# Patient Record
Sex: MALE | Race: WHITE | NOT HISPANIC OR LATINO
[De-identification: names, ages, dates, MRNs, and addresses within clinical notes are randomized per-mention and may not be internally consistent; named-entity substitution may affect disease eponyms.]

---

## 2017-10-02 ENCOUNTER — RX RENEWAL (OUTPATIENT)
Age: 72
End: 2017-10-02

## 2017-10-07 ENCOUNTER — TRANSCRIPTION ENCOUNTER (OUTPATIENT)
Age: 72
End: 2017-10-07

## 2017-10-25 ENCOUNTER — RX RENEWAL (OUTPATIENT)
Age: 72
End: 2017-10-25

## 2017-10-25 ENCOUNTER — FORM ENCOUNTER (OUTPATIENT)
Age: 72
End: 2017-10-25

## 2017-10-25 ENCOUNTER — APPOINTMENT (OUTPATIENT)
Dept: FAMILY MEDICINE | Facility: CLINIC | Age: 72
End: 2017-10-25
Payer: MEDICARE

## 2017-10-25 VITALS — RESPIRATION RATE: 16 BRPM | HEART RATE: 72 BPM | SYSTOLIC BLOOD PRESSURE: 130 MMHG | DIASTOLIC BLOOD PRESSURE: 80 MMHG

## 2017-10-25 VITALS
DIASTOLIC BLOOD PRESSURE: 82 MMHG | BODY MASS INDEX: 29.53 KG/M2 | HEIGHT: 72 IN | OXYGEN SATURATION: 95 % | WEIGHT: 218 LBS | SYSTOLIC BLOOD PRESSURE: 130 MMHG | HEART RATE: 66 BPM

## 2017-10-25 DIAGNOSIS — R10.824 LEFT LOWER QUADRANT REBOUND ABDOMINAL TENDERNESS: ICD-10-CM

## 2017-10-25 DIAGNOSIS — N40.0 BENIGN PROSTATIC HYPERPLASIA WITHOUT LOWER URINARY TRACT SYMPMS: ICD-10-CM

## 2017-10-25 PROCEDURE — G0008: CPT

## 2017-10-25 PROCEDURE — 90662 IIV NO PRSV INCREASED AG IM: CPT

## 2017-10-25 PROCEDURE — G0439: CPT

## 2017-10-26 ENCOUNTER — MED ADMIN CHARGE (OUTPATIENT)
Age: 72
End: 2017-10-26

## 2017-10-26 ENCOUNTER — OUTPATIENT (OUTPATIENT)
Dept: OUTPATIENT SERVICES | Facility: HOSPITAL | Age: 72
LOS: 1 days | End: 2017-10-26
Payer: MEDICARE

## 2017-10-26 ENCOUNTER — APPOINTMENT (OUTPATIENT)
Dept: CT IMAGING | Facility: CLINIC | Age: 72
End: 2017-10-26

## 2017-10-26 DIAGNOSIS — Z00.8 ENCOUNTER FOR OTHER GENERAL EXAMINATION: ICD-10-CM

## 2017-10-26 LAB
ALBUMIN SERPL ELPH-MCNC: 4 G/DL
ALP BLD-CCNC: 56 U/L
ALT SERPL-CCNC: 15 U/L
ANION GAP SERPL CALC-SCNC: 13 MMOL/L
APPEARANCE: CLEAR
AST SERPL-CCNC: 14 U/L
BACTERIA: ABNORMAL
BASOPHILS # BLD AUTO: 0.01 K/UL
BASOPHILS NFR BLD AUTO: 0.1 %
BILIRUB SERPL-MCNC: 0.6 MG/DL
BILIRUBIN URINE: NEGATIVE
BLOOD URINE: ABNORMAL
BUN SERPL-MCNC: 15 MG/DL
CALCIUM OXALATE CRYSTALS: ABNORMAL
CALCIUM SERPL-MCNC: 9.8 MG/DL
CHLORIDE SERPL-SCNC: 102 MMOL/L
CHOLEST SERPL-MCNC: 154 MG/DL
CHOLEST/HDLC SERPL: 4.2 RATIO
CO2 SERPL-SCNC: 29 MMOL/L
COLOR: YELLOW
CREAT SERPL-MCNC: 1.08 MG/DL
CREAT SPEC-SCNC: 187 MG/DL
EOSINOPHIL # BLD AUTO: 0.13 K/UL
EOSINOPHIL NFR BLD AUTO: 1.7 %
GLUCOSE QUALITATIVE U: NEGATIVE MG/DL
GLUCOSE SERPL-MCNC: 97 MG/DL
HBA1C MFR BLD HPLC: 6.3 %
HCT VFR BLD CALC: 42 %
HDLC SERPL-MCNC: 37 MG/DL
HGB BLD-MCNC: 13.4 G/DL
HYALINE CASTS: 0 /LPF
IMM GRANULOCYTES NFR BLD AUTO: 0.3 %
KETONES URINE: NEGATIVE
LDLC SERPL CALC-MCNC: 77 MG/DL
LEUKOCYTE ESTERASE URINE: ABNORMAL
LYMPHOCYTES # BLD AUTO: 1.74 K/UL
LYMPHOCYTES NFR BLD AUTO: 23.3 %
MAN DIFF?: NORMAL
MCHC RBC-ENTMCNC: 30.6 PG
MCHC RBC-ENTMCNC: 31.9 GM/DL
MCV RBC AUTO: 95.9 FL
MICROALBUMIN 24H UR DL<=1MG/L-MCNC: 1.2 MG/DL
MICROALBUMIN/CREAT 24H UR-RTO: 6 MG/G
MICROSCOPIC-UA: NORMAL
MONOCYTES # BLD AUTO: 0.51 K/UL
MONOCYTES NFR BLD AUTO: 6.8 %
NEUTROPHILS # BLD AUTO: 5.07 K/UL
NEUTROPHILS NFR BLD AUTO: 67.8 %
NITRITE URINE: POSITIVE
PH URINE: 5.5
PLATELET # BLD AUTO: 236 K/UL
POTASSIUM SERPL-SCNC: 4.7 MMOL/L
PROT SERPL-MCNC: 6.9 G/DL
PROTEIN URINE: NEGATIVE MG/DL
RBC # BLD: 4.38 M/UL
RBC # FLD: 14.2 %
RED BLOOD CELLS URINE: 2 /HPF
SODIUM SERPL-SCNC: 144 MMOL/L
SPECIFIC GRAVITY URINE: 1.03
SQUAMOUS EPITHELIAL CELLS: 0 /HPF
T4 SERPL-MCNC: 5.2 UG/DL
TRIGL SERPL-MCNC: 202 MG/DL
TSH SERPL-ACNC: 1.24 UIU/ML
URATE SERPL-MCNC: 5.7 MG/DL
UROBILINOGEN URINE: NEGATIVE MG/DL
WBC # FLD AUTO: 7.48 K/UL
WHITE BLOOD CELLS URINE: 34 /HPF

## 2017-10-26 PROCEDURE — 74177 CT ABD & PELVIS W/CONTRAST: CPT

## 2017-10-26 PROCEDURE — 74177 CT ABD & PELVIS W/CONTRAST: CPT | Mod: 26

## 2017-10-26 PROCEDURE — 82565 ASSAY OF CREATININE: CPT

## 2017-10-28 LAB — LAMOTRIGINE SERPL-MCNC: 4.7 MCG/ML

## 2017-10-31 ENCOUNTER — RX RENEWAL (OUTPATIENT)
Age: 72
End: 2017-10-31

## 2017-10-31 DIAGNOSIS — G47.00 INSOMNIA, UNSPECIFIED: ICD-10-CM

## 2017-11-04 LAB — HEMOCCULT STL QL IA: NEGATIVE

## 2017-11-06 ENCOUNTER — APPOINTMENT (OUTPATIENT)
Dept: FAMILY MEDICINE | Facility: CLINIC | Age: 72
End: 2017-11-06
Payer: MEDICARE

## 2017-11-06 ENCOUNTER — RX RENEWAL (OUTPATIENT)
Age: 72
End: 2017-11-06

## 2017-11-06 VITALS
BODY MASS INDEX: 29.8 KG/M2 | DIASTOLIC BLOOD PRESSURE: 80 MMHG | SYSTOLIC BLOOD PRESSURE: 140 MMHG | WEIGHT: 220 LBS | HEIGHT: 72 IN

## 2017-11-06 VITALS — DIASTOLIC BLOOD PRESSURE: 80 MMHG | SYSTOLIC BLOOD PRESSURE: 140 MMHG

## 2017-11-06 PROCEDURE — 99213 OFFICE O/P EST LOW 20 MIN: CPT

## 2017-11-14 ENCOUNTER — RX RENEWAL (OUTPATIENT)
Age: 72
End: 2017-11-14

## 2018-06-12 ENCOUNTER — APPOINTMENT (OUTPATIENT)
Dept: FAMILY MEDICINE | Facility: CLINIC | Age: 73
End: 2018-06-12
Payer: MEDICARE

## 2018-06-12 ENCOUNTER — TRANSCRIPTION ENCOUNTER (OUTPATIENT)
Age: 73
End: 2018-06-12

## 2018-06-12 VITALS
BODY MASS INDEX: 28.58 KG/M2 | TEMPERATURE: 97.6 F | RESPIRATION RATE: 16 BRPM | OXYGEN SATURATION: 97 % | WEIGHT: 211 LBS | HEIGHT: 72 IN | SYSTOLIC BLOOD PRESSURE: 132 MMHG | DIASTOLIC BLOOD PRESSURE: 76 MMHG | HEART RATE: 77 BPM

## 2018-06-12 VITALS — SYSTOLIC BLOOD PRESSURE: 130 MMHG | HEART RATE: 72 BPM | DIASTOLIC BLOOD PRESSURE: 80 MMHG | RESPIRATION RATE: 16 BRPM

## 2018-06-12 DIAGNOSIS — K57.92 DIVERTICULITIS OF INTESTINE, PART UNSPECIFIED, W/OUT PERFORATION OR ABSCESS W/OUT BLEEDING: ICD-10-CM

## 2018-06-12 PROCEDURE — 36415 COLL VENOUS BLD VENIPUNCTURE: CPT

## 2018-06-12 PROCEDURE — 99214 OFFICE O/P EST MOD 30 MIN: CPT | Mod: 25

## 2018-06-12 RX ORDER — RAMELTEON 8 MG/1
8 TABLET, FILM COATED ORAL
Qty: 90 | Refills: 0 | Status: COMPLETED | COMMUNITY
Start: 2017-11-06 | End: 2018-06-12

## 2018-06-12 RX ORDER — CIPROFLOXACIN HYDROCHLORIDE 500 MG/1
500 TABLET, FILM COATED ORAL TWICE DAILY
Qty: 20 | Refills: 0 | Status: COMPLETED | COMMUNITY
Start: 2017-10-26 | End: 2018-06-12

## 2018-06-12 NOTE — HISTORY OF PRESENT ILLNESS
[Abdominal Pain] : abdominal pain [___ Weeks ago] : [unfilled] weeks ago [In Morning] : in the morning [Stable] : stable [FreeTextEntry8] : no   seizures   glucose  has  been good  checks occ  never more than 160

## 2018-06-12 NOTE — ASSESSMENT
[FreeTextEntry1] : DIVERTICULITIS START CIPRO AND FLAGY CT ABD AND PELVIS DM HLD  CHECK LABS  USING  C-PAP

## 2018-06-12 NOTE — PHYSICAL EXAM
[No Acute Distress] : no acute distress [Normal Voice/Communication] : normal voice/communication [PERRL] : pupils equal round and reactive to light [Normal Oropharynx] : the oropharynx was normal [Supple] : supple [Clear to Auscultation] : lungs were clear to auscultation bilaterally [No Edema] : there was no peripheral edema [Soft] : abdomen soft [No HSM] : no HSM [Normal Bowel Sounds] : normal bowel sounds [Normal Supraclavicular Nodes] : no supraclavicular lymphadenopathy [Normal Anterior Cervical Nodes] : no anterior cervical lymphadenopathy [No Rash] : no rash [Normal Gait] : normal gait [Speech Grossly Normal] : speech grossly normal [de-identified] : jose rafael ORTIZ

## 2018-06-13 LAB
ALBUMIN SERPL ELPH-MCNC: 4.1 G/DL
ALP BLD-CCNC: 63 U/L
ALT SERPL-CCNC: 14 U/L
ANION GAP SERPL CALC-SCNC: 14 MMOL/L
APPEARANCE: CLEAR
AST SERPL-CCNC: 20 U/L
BACTERIA: ABNORMAL
BASOPHILS # BLD AUTO: 0.01 K/UL
BASOPHILS NFR BLD AUTO: 0.2 %
BILIRUB SERPL-MCNC: 0.6 MG/DL
BILIRUBIN URINE: NEGATIVE
BLOOD URINE: ABNORMAL
BUN SERPL-MCNC: 21 MG/DL
CALCIUM SERPL-MCNC: 9.5 MG/DL
CHLORIDE SERPL-SCNC: 103 MMOL/L
CHOLEST SERPL-MCNC: 154 MG/DL
CHOLEST/HDLC SERPL: 4.4 RATIO
CO2 SERPL-SCNC: 28 MMOL/L
COLOR: YELLOW
CREAT SERPL-MCNC: 1.25 MG/DL
CREAT SPEC-SCNC: 170 MG/DL
EOSINOPHIL # BLD AUTO: 0.17 K/UL
EOSINOPHIL NFR BLD AUTO: 2.6 %
GLUCOSE QUALITATIVE U: NEGATIVE MG/DL
GLUCOSE SERPL-MCNC: 89 MG/DL
HBA1C MFR BLD HPLC: 6.6 %
HCT VFR BLD CALC: 43.9 %
HDLC SERPL-MCNC: 35 MG/DL
HGB BLD-MCNC: 13.7 G/DL
HYALINE CASTS: 0 /LPF
IMM GRANULOCYTES NFR BLD AUTO: 0.3 %
KETONES URINE: NEGATIVE
LDLC SERPL CALC-MCNC: 74 MG/DL
LEUKOCYTE ESTERASE URINE: NEGATIVE
LYMPHOCYTES # BLD AUTO: 1.89 K/UL
LYMPHOCYTES NFR BLD AUTO: 28.6 %
MAN DIFF?: NORMAL
MCHC RBC-ENTMCNC: 29.9 PG
MCHC RBC-ENTMCNC: 31.2 GM/DL
MCV RBC AUTO: 95.9 FL
MICROALBUMIN 24H UR DL<=1MG/L-MCNC: 0.5 MG/DL
MICROALBUMIN/CREAT 24H UR-RTO: 3 MG/G
MICROSCOPIC-UA: NORMAL
MONOCYTES # BLD AUTO: 0.43 K/UL
MONOCYTES NFR BLD AUTO: 6.5 %
NEUTROPHILS # BLD AUTO: 4.08 K/UL
NEUTROPHILS NFR BLD AUTO: 61.8 %
NITRITE URINE: POSITIVE
PH URINE: 5.5
PLATELET # BLD AUTO: 264 K/UL
POTASSIUM SERPL-SCNC: 4.6 MMOL/L
PROT SERPL-MCNC: 7.5 G/DL
PROTEIN URINE: NEGATIVE MG/DL
RBC # BLD: 4.58 M/UL
RBC # FLD: 14.1 %
RED BLOOD CELLS URINE: 0 /HPF
SODIUM SERPL-SCNC: 145 MMOL/L
SPECIFIC GRAVITY URINE: 1.03
SQUAMOUS EPITHELIAL CELLS: 1 /HPF
T4 SERPL-MCNC: 6.6 UG/DL
TRIGL SERPL-MCNC: 226 MG/DL
TSH SERPL-ACNC: 0.93 UIU/ML
URATE SERPL-MCNC: 6.1 MG/DL
UROBILINOGEN URINE: NEGATIVE MG/DL
WBC # FLD AUTO: 6.6 K/UL
WHITE BLOOD CELLS URINE: 4 /HPF

## 2018-06-17 ENCOUNTER — FORM ENCOUNTER (OUTPATIENT)
Age: 73
End: 2018-06-17

## 2018-06-18 ENCOUNTER — OUTPATIENT (OUTPATIENT)
Dept: OUTPATIENT SERVICES | Facility: HOSPITAL | Age: 73
LOS: 1 days | End: 2018-06-18
Payer: MEDICARE

## 2018-06-18 ENCOUNTER — APPOINTMENT (OUTPATIENT)
Dept: CT IMAGING | Facility: CLINIC | Age: 73
End: 2018-06-18
Payer: MEDICARE

## 2018-06-18 DIAGNOSIS — Z00.8 ENCOUNTER FOR OTHER GENERAL EXAMINATION: ICD-10-CM

## 2018-06-18 PROCEDURE — 74177 CT ABD & PELVIS W/CONTRAST: CPT | Mod: 26

## 2018-06-18 PROCEDURE — 74177 CT ABD & PELVIS W/CONTRAST: CPT

## 2018-06-19 ENCOUNTER — TRANSCRIPTION ENCOUNTER (OUTPATIENT)
Age: 73
End: 2018-06-19

## 2018-06-20 ENCOUNTER — TRANSCRIPTION ENCOUNTER (OUTPATIENT)
Age: 73
End: 2018-06-20

## 2018-09-25 ENCOUNTER — RX RENEWAL (OUTPATIENT)
Age: 73
End: 2018-09-25

## 2018-10-24 ENCOUNTER — RX RENEWAL (OUTPATIENT)
Age: 73
End: 2018-10-24

## 2018-10-28 ENCOUNTER — RX RENEWAL (OUTPATIENT)
Age: 73
End: 2018-10-28

## 2018-10-29 ENCOUNTER — TRANSCRIPTION ENCOUNTER (OUTPATIENT)
Age: 73
End: 2018-10-29

## 2018-11-05 ENCOUNTER — TRANSCRIPTION ENCOUNTER (OUTPATIENT)
Age: 73
End: 2018-11-05

## 2018-12-13 ENCOUNTER — APPOINTMENT (OUTPATIENT)
Dept: FAMILY MEDICINE | Facility: CLINIC | Age: 73
End: 2018-12-13
Payer: MEDICARE

## 2018-12-13 VITALS — SYSTOLIC BLOOD PRESSURE: 130 MMHG | RESPIRATION RATE: 16 BRPM | HEART RATE: 72 BPM | DIASTOLIC BLOOD PRESSURE: 80 MMHG

## 2018-12-13 VITALS
SYSTOLIC BLOOD PRESSURE: 140 MMHG | HEART RATE: 73 BPM | OXYGEN SATURATION: 97 % | DIASTOLIC BLOOD PRESSURE: 80 MMHG | BODY MASS INDEX: 30.09 KG/M2 | HEIGHT: 72 IN | WEIGHT: 222.13 LBS

## 2018-12-13 PROCEDURE — G0439: CPT

## 2018-12-13 PROCEDURE — 99213 OFFICE O/P EST LOW 20 MIN: CPT | Mod: 25

## 2018-12-13 RX ORDER — METRONIDAZOLE 250 MG/1
250 TABLET ORAL
Qty: 30 | Refills: 0 | Status: COMPLETED | COMMUNITY
Start: 2018-06-12 | End: 2018-12-13

## 2018-12-13 RX ORDER — CIPROFLOXACIN HYDROCHLORIDE 500 MG/1
500 TABLET, FILM COATED ORAL TWICE DAILY
Qty: 20 | Refills: 0 | Status: COMPLETED | COMMUNITY
Start: 2018-06-12 | End: 2018-12-13

## 2018-12-13 NOTE — REVIEW OF SYSTEMS
[Itching] : itching [Hearing Loss] : hearing loss [Shortness Of Breath] : shortness of breath [Nocturia] : nocturia [Negative] : Heme/Lymph [Fatigue] : no fatigue

## 2018-12-13 NOTE — PHYSICAL EXAM
[No Acute Distress] : no acute distress [Normal Voice/Communication] : normal voice/communication [PERRL] : pupils equal round and reactive to light [Normal Oropharynx] : the oropharynx was normal [Supple] : supple [Clear to Auscultation] : lungs were clear to auscultation bilaterally [No Edema] : there was no peripheral edema [Soft] : abdomen soft [No HSM] : no HSM [Normal Bowel Sounds] : normal bowel sounds [Normal Supraclavicular Nodes] : no supraclavicular lymphadenopathy [Normal Anterior Cervical Nodes] : no anterior cervical lymphadenopathy [No Rash] : no rash [Normal Gait] : normal gait [Speech Grossly Normal] : speech grossly normal [de-identified] : VENTRAL HERNIA

## 2018-12-13 NOTE — HEALTH RISK ASSESSMENT
[Very Good] : ~his/her~  mood as very good [No falls in past year] : Patient reported no falls in the past year [0] : 2) Feeling down, depressed, or hopeless: Not at all (0) [Patient reported colonoscopy was normal] : Patient reported colonoscopy was normal [None] : None [With Family] : lives with family [] :  [Fully functional (bathing, dressing, toileting, transferring, walking, feeding)] : Fully functional (bathing, dressing, toileting, transferring, walking, feeding) [Fully functional (using the telephone, shopping, preparing meals, housekeeping, doing laundry, using] : Fully functional and needs no help or supervision to perform IADLs (using the telephone, shopping, preparing meals, housekeeping, doing laundry, using transportation, managing medications and managing finances) [Reports changes in hearing] : Reports changes in hearing [] : No [de-identified] : OCC  [Change in mental status noted] : No change in mental status noted [Reports changes in vision] : Reports no changes in vision [Reports changes in dental health] : Reports no changes in dental health

## 2018-12-13 NOTE — HISTORY OF PRESENT ILLNESS
[FreeTextEntry1] : pt here  for cpe and management of HTN DM HLD  DEPRESSION  [de-identified] : MOOD  GOOD DOES NOT CHECK GLUCOSE OFTEN TAKING ALL MEDS NO SEIZURES

## 2018-12-15 LAB
ALBUMIN SERPL ELPH-MCNC: 4.4 G/DL
ALP BLD-CCNC: 72 U/L
ALT SERPL-CCNC: 17 U/L
ANION GAP SERPL CALC-SCNC: 11 MMOL/L
APPEARANCE: CLEAR
AST SERPL-CCNC: 16 U/L
BACTERIA: ABNORMAL
BASOPHILS # BLD AUTO: 0.01 K/UL
BASOPHILS NFR BLD AUTO: 0.1 %
BILIRUB SERPL-MCNC: 0.6 MG/DL
BILIRUBIN URINE: NEGATIVE
BLOOD URINE: ABNORMAL
BUN SERPL-MCNC: 16 MG/DL
CALCIUM SERPL-MCNC: 9.6 MG/DL
CHLORIDE SERPL-SCNC: 105 MMOL/L
CHOLEST SERPL-MCNC: 147 MG/DL
CHOLEST/HDLC SERPL: 4.1 RATIO
CO2 SERPL-SCNC: 29 MMOL/L
COLOR: YELLOW
CREAT SERPL-MCNC: 1.07 MG/DL
CREAT SPEC-SCNC: 132 MG/DL
EOSINOPHIL # BLD AUTO: 0.12 K/UL
EOSINOPHIL NFR BLD AUTO: 1.7 %
GLUCOSE QUALITATIVE U: NEGATIVE MG/DL
GLUCOSE SERPL-MCNC: 121 MG/DL
HBA1C MFR BLD HPLC: 6.4 %
HCT VFR BLD CALC: 44.9 %
HDLC SERPL-MCNC: 36 MG/DL
HGB BLD-MCNC: 14.2 G/DL
IMM GRANULOCYTES NFR BLD AUTO: 0.4 %
KETONES URINE: NEGATIVE
LDLC SERPL CALC-MCNC: 51 MG/DL
LEUKOCYTE ESTERASE URINE: ABNORMAL
LYMPHOCYTES # BLD AUTO: 1.39 K/UL
LYMPHOCYTES NFR BLD AUTO: 19.1 %
MAN DIFF?: NORMAL
MCHC RBC-ENTMCNC: 29.3 PG
MCHC RBC-ENTMCNC: 31.6 GM/DL
MCV RBC AUTO: 92.8 FL
MICROALBUMIN 24H UR DL<=1MG/L-MCNC: <1.2 MG/DL
MICROALBUMIN/CREAT 24H UR-RTO: NORMAL
MICROSCOPIC-UA: NORMAL
MONOCYTES # BLD AUTO: 0.5 K/UL
MONOCYTES NFR BLD AUTO: 6.9 %
NEUTROPHILS # BLD AUTO: 5.22 K/UL
NEUTROPHILS NFR BLD AUTO: 71.8 %
NITRITE URINE: POSITIVE
PH URINE: 5
PLATELET # BLD AUTO: 252 K/UL
POTASSIUM SERPL-SCNC: 4.7 MMOL/L
PROT SERPL-MCNC: 7 G/DL
PROTEIN URINE: NEGATIVE MG/DL
RBC # BLD: 4.84 M/UL
RBC # FLD: 15.2 %
RED BLOOD CELLS URINE: 3 /HPF
SODIUM SERPL-SCNC: 145 MMOL/L
SPECIFIC GRAVITY URINE: 1.02
SQUAMOUS EPITHELIAL CELLS: 1 /HPF
T4 SERPL-MCNC: 6.6 UG/DL
TRIGL SERPL-MCNC: 302 MG/DL
TSH SERPL-ACNC: 1.14 UIU/ML
URATE SERPL-MCNC: 6 MG/DL
UROBILINOGEN URINE: NEGATIVE MG/DL
WBC # FLD AUTO: 7.27 K/UL
WHITE BLOOD CELLS URINE: 13 /HPF

## 2018-12-17 ENCOUNTER — TRANSCRIPTION ENCOUNTER (OUTPATIENT)
Age: 73
End: 2018-12-17

## 2019-04-03 ENCOUNTER — TRANSCRIPTION ENCOUNTER (OUTPATIENT)
Age: 74
End: 2019-04-03

## 2019-04-05 ENCOUNTER — TRANSCRIPTION ENCOUNTER (OUTPATIENT)
Age: 74
End: 2019-04-05

## 2019-04-09 ENCOUNTER — APPOINTMENT (OUTPATIENT)
Dept: FAMILY MEDICINE | Facility: CLINIC | Age: 74
End: 2019-04-09
Payer: MEDICARE

## 2019-04-09 VITALS — HEART RATE: 72 BPM | SYSTOLIC BLOOD PRESSURE: 140 MMHG | RESPIRATION RATE: 16 BRPM | DIASTOLIC BLOOD PRESSURE: 80 MMHG

## 2019-04-09 VITALS
HEART RATE: 68 BPM | SYSTOLIC BLOOD PRESSURE: 126 MMHG | OXYGEN SATURATION: 96 % | BODY MASS INDEX: 29.12 KG/M2 | DIASTOLIC BLOOD PRESSURE: 80 MMHG | RESPIRATION RATE: 16 BRPM | WEIGHT: 215 LBS | HEIGHT: 72 IN

## 2019-04-09 PROCEDURE — 99214 OFFICE O/P EST MOD 30 MIN: CPT

## 2019-04-09 RX ORDER — ZOLPIDEM TARTRATE 10 MG/1
10 TABLET ORAL
Qty: 90 | Refills: 0 | Status: COMPLETED | COMMUNITY
Start: 2017-10-31 | End: 2019-04-09

## 2019-04-09 NOTE — PHYSICAL EXAM
[No Acute Distress] : no acute distress [PERRL] : pupils equal round and reactive to light [Normal Oropharynx] : the oropharynx was normal [Normal Voice/Communication] : normal voice/communication [Supple] : supple [Clear to Auscultation] : lungs were clear to auscultation bilaterally [No Edema] : there was no peripheral edema [Soft] : abdomen soft [Normal Supraclavicular Nodes] : no supraclavicular lymphadenopathy [Normal Bowel Sounds] : normal bowel sounds [No HSM] : no HSM [Normal Gait] : normal gait [No Rash] : no rash [Normal Anterior Cervical Nodes] : no anterior cervical lymphadenopathy [Speech Grossly Normal] : speech grossly normal [de-identified] : VENTRAL HERNIA

## 2019-04-09 NOTE — HISTORY OF PRESENT ILLNESS
[de-identified] : has  been  using  c-pap  for  over  30 yrs  last machine  was  5 yrs  ago needs  new  machine no  recent  seizures checks  glucose  tid  ave 120s last  a1c  6.4 taking sertraline and doing  well mood  good has  not gone  for mri of  Pancrease for  cyst  [FreeTextEntry1] : [facility]  management

## 2019-04-09 NOTE — REVIEW OF SYSTEMS
[Itching] : itching [Hearing Loss] : hearing loss [Dyspnea on Exertion] : dyspnea on exertion [Shortness Of Breath] : shortness of breath [Nocturia] : nocturia [Insomnia] : insomnia [Negative] : Psychiatric [Palpitations] : no palpitations [Chest Pain] : no chest pain [Fatigue] : no fatigue [Constipation] : no constipation [Diarrhea] : no diarrhea [Dysuria] : no dysuria [Swollen Glands] : no swollen glands

## 2019-04-10 ENCOUNTER — TRANSCRIPTION ENCOUNTER (OUTPATIENT)
Age: 74
End: 2019-04-10

## 2019-04-10 LAB
ALBUMIN SERPL ELPH-MCNC: 4.4 G/DL
ALP BLD-CCNC: 70 U/L
ALT SERPL-CCNC: 17 U/L
ANION GAP SERPL CALC-SCNC: 10 MMOL/L
APPEARANCE: ABNORMAL
AST SERPL-CCNC: 16 U/L
BACTERIA: ABNORMAL
BASOPHILS # BLD AUTO: 0.03 K/UL
BASOPHILS NFR BLD AUTO: 0.3 %
BILIRUB SERPL-MCNC: 0.6 MG/DL
BILIRUBIN URINE: NEGATIVE
BLOOD URINE: NORMAL
BUN SERPL-MCNC: 15 MG/DL
CALCIUM OXALATE CRYSTALS: ABNORMAL
CALCIUM SERPL-MCNC: 9.6 MG/DL
CHLORIDE SERPL-SCNC: 105 MMOL/L
CHOLEST SERPL-MCNC: 143 MG/DL
CHOLEST/HDLC SERPL: 4.1 RATIO
CO2 SERPL-SCNC: 30 MMOL/L
COLOR: YELLOW
CREAT SERPL-MCNC: 1.15 MG/DL
CREAT SPEC-SCNC: 152 MG/DL
EOSINOPHIL # BLD AUTO: 0.19 K/UL
EOSINOPHIL NFR BLD AUTO: 2.2 %
ESTIMATED AVERAGE GLUCOSE: 146 MG/DL
GLUCOSE QUALITATIVE U: NEGATIVE
GLUCOSE SERPL-MCNC: 126 MG/DL
HBA1C MFR BLD HPLC: 6.7 %
HCT VFR BLD CALC: 43.7 %
HDLC SERPL-MCNC: 35 MG/DL
HGB BLD-MCNC: 13.9 G/DL
HYALINE CASTS: 2 /LPF
IMM GRANULOCYTES NFR BLD AUTO: 0.7 %
KETONES URINE: NEGATIVE
LDLC SERPL CALC-MCNC: 58 MG/DL
LEUKOCYTE ESTERASE URINE: ABNORMAL
LYMPHOCYTES # BLD AUTO: 1.9 K/UL
LYMPHOCYTES NFR BLD AUTO: 21.6 %
MAN DIFF?: NORMAL
MCHC RBC-ENTMCNC: 29.4 PG
MCHC RBC-ENTMCNC: 31.8 GM/DL
MCV RBC AUTO: 92.6 FL
MICROALBUMIN 24H UR DL<=1MG/L-MCNC: <1.2 MG/DL
MICROALBUMIN/CREAT 24H UR-RTO: NORMAL MG/G
MICROSCOPIC-UA: NORMAL
MONOCYTES # BLD AUTO: 0.5 K/UL
MONOCYTES NFR BLD AUTO: 5.7 %
NEUTROPHILS # BLD AUTO: 6.13 K/UL
NEUTROPHILS NFR BLD AUTO: 69.5 %
NITRITE URINE: POSITIVE
PH URINE: 6
PLATELET # BLD AUTO: 254 K/UL
POTASSIUM SERPL-SCNC: 4.5 MMOL/L
PROT SERPL-MCNC: 6.9 G/DL
PROTEIN URINE: ABNORMAL
RBC # BLD: 4.72 M/UL
RBC # FLD: 14.9 %
RED BLOOD CELLS URINE: 2 /HPF
SODIUM SERPL-SCNC: 144 MMOL/L
SPECIFIC GRAVITY URINE: 1.02
SQUAMOUS EPITHELIAL CELLS: 3 /HPF
T4 SERPL-MCNC: 5.7 UG/DL
TRIGL SERPL-MCNC: 250 MG/DL
TSH SERPL-ACNC: 1.4 UIU/ML
URATE SERPL-MCNC: 5.8 MG/DL
URINE COMMENTS: NORMAL
UROBILINOGEN URINE: NORMAL
WBC # FLD AUTO: 8.81 K/UL
WHITE BLOOD CELLS URINE: 50 /HPF

## 2019-04-12 ENCOUNTER — RX RENEWAL (OUTPATIENT)
Age: 74
End: 2019-04-12

## 2019-04-12 ENCOUNTER — TRANSCRIPTION ENCOUNTER (OUTPATIENT)
Age: 74
End: 2019-04-12

## 2019-04-14 ENCOUNTER — FORM ENCOUNTER (OUTPATIENT)
Age: 74
End: 2019-04-14

## 2019-04-15 ENCOUNTER — OUTPATIENT (OUTPATIENT)
Dept: OUTPATIENT SERVICES | Facility: HOSPITAL | Age: 74
LOS: 1 days | End: 2019-04-15
Payer: MEDICARE

## 2019-04-15 ENCOUNTER — APPOINTMENT (OUTPATIENT)
Dept: MRI IMAGING | Facility: CLINIC | Age: 74
End: 2019-04-15
Payer: MEDICARE

## 2019-04-15 DIAGNOSIS — Z00.8 ENCOUNTER FOR OTHER GENERAL EXAMINATION: ICD-10-CM

## 2019-04-15 PROCEDURE — 72197 MRI PELVIS W/O & W/DYE: CPT

## 2019-04-15 PROCEDURE — 74183 MRI ABD W/O CNTR FLWD CNTR: CPT | Mod: 26

## 2019-04-15 PROCEDURE — 72197 MRI PELVIS W/O & W/DYE: CPT | Mod: 26

## 2019-04-15 PROCEDURE — 74183 MRI ABD W/O CNTR FLWD CNTR: CPT

## 2019-04-15 PROCEDURE — A9585: CPT

## 2019-04-16 ENCOUNTER — TRANSCRIPTION ENCOUNTER (OUTPATIENT)
Age: 74
End: 2019-04-16

## 2019-04-18 ENCOUNTER — TRANSCRIPTION ENCOUNTER (OUTPATIENT)
Age: 74
End: 2019-04-18

## 2019-04-20 ENCOUNTER — TRANSCRIPTION ENCOUNTER (OUTPATIENT)
Age: 74
End: 2019-04-20

## 2019-06-12 ENCOUNTER — RX RENEWAL (OUTPATIENT)
Age: 74
End: 2019-06-12

## 2019-10-28 ENCOUNTER — RX RENEWAL (OUTPATIENT)
Age: 74
End: 2019-10-28

## 2019-12-29 ENCOUNTER — RX RENEWAL (OUTPATIENT)
Age: 74
End: 2019-12-29

## 2019-12-31 ENCOUNTER — APPOINTMENT (OUTPATIENT)
Dept: FAMILY MEDICINE | Facility: CLINIC | Age: 74
End: 2019-12-31
Payer: MEDICARE

## 2019-12-31 ENCOUNTER — RX RENEWAL (OUTPATIENT)
Age: 74
End: 2019-12-31

## 2019-12-31 VITALS — DIASTOLIC BLOOD PRESSURE: 90 MMHG | RESPIRATION RATE: 16 BRPM | HEART RATE: 72 BPM | SYSTOLIC BLOOD PRESSURE: 130 MMHG

## 2019-12-31 VITALS
HEART RATE: 70 BPM | HEIGHT: 72 IN | WEIGHT: 219 LBS | DIASTOLIC BLOOD PRESSURE: 84 MMHG | BODY MASS INDEX: 29.66 KG/M2 | SYSTOLIC BLOOD PRESSURE: 136 MMHG | OXYGEN SATURATION: 96 %

## 2019-12-31 PROCEDURE — 99213 OFFICE O/P EST LOW 20 MIN: CPT | Mod: 25

## 2019-12-31 PROCEDURE — G0439: CPT

## 2019-12-31 RX ORDER — KETOTIFEN FUMARATE 0.25 MG/ML
0.03 SOLUTION OPHTHALMIC
Qty: 1 | Refills: 2 | Status: COMPLETED | COMMUNITY
Start: 2018-12-17 | End: 2019-12-31

## 2019-12-31 NOTE — PHYSICAL EXAM
[No Acute Distress] : no acute distress [PERRL] : pupils equal round and reactive to light [Normal Oropharynx] : the oropharynx was normal [Supple] : supple [Normal Rate] : normal rate  [No Murmur] : no murmur heard [No Edema] : there was no peripheral edema [Normal] : no posterior cervical lymphadenopathy and no anterior cervical lymphadenopathy [No Joint Swelling] : no joint swelling [No Rash] : no rash [Normal Gait] : normal gait [FreeTextEntry1] : VALERIA  [de-identified] : VALERIA

## 2019-12-31 NOTE — REVIEW OF SYSTEMS
[Dyspnea on Exertion] : dyspnea on exertion [Joint Swelling] : joint swelling [Fever] : no fever [Chest Pain] : no chest pain [Abdominal Pain] : no abdominal pain [Diarrhea] : no diarrhea [Constipation] : no constipation [Joint Pain] : no joint pain [Skin Rash] : no skin rash [Dizziness] : no dizziness [Insomnia] : no insomnia [Swollen Glands] : no swollen glands

## 2019-12-31 NOTE — HEALTH RISK ASSESSMENT
[Very Good] : ~his/her~  mood as very good [Yes] : Yes [3 or 4 (1 pt)] : 3 or 4  (1 point) [No falls in past year] : Patient reported no falls in the past year [0] : 2) Feeling down, depressed, or hopeless: Not at all (0) [Patient reported colonoscopy was normal] : Patient reported colonoscopy was normal [None] : None [With Family] : lives with family [Employed] : employed [Retired] : retired [College] : College [] :  [# Of Children ___] : has [unfilled] children [Fully functional (bathing, dressing, toileting, transferring, walking, feeding)] : Fully functional (bathing, dressing, toileting, transferring, walking, feeding) [Fully functional (using the telephone, shopping, preparing meals, housekeeping, doing laundry, using] : Fully functional and needs no help or supervision to perform IADLs (using the telephone, shopping, preparing meals, housekeeping, doing laundry, using transportation, managing medications and managing finances) [Reports changes in hearing] : Reports changes in hearing [Reports changes in vision] : Reports changes in vision [Smoke Detector] : smoke detector [Carbon Monoxide Detector] : carbon monoxide detector [Seat Belt] :  uses seat belt [de-identified] : NO  [Change in mental status noted] : No change in mental status noted [Reports changes in dental health] : Reports no changes in dental health [ColonoscopyDate] : 1/18 [de-identified] : HOME  SALES  [de-identified] : CATARACTS  [AdvancecareDate] : 12/19

## 2020-01-01 ENCOUNTER — TRANSCRIPTION ENCOUNTER (OUTPATIENT)
Age: 75
End: 2020-01-01

## 2020-01-01 LAB
ALBUMIN SERPL ELPH-MCNC: 4.3 G/DL
ALP BLD-CCNC: 72 U/L
ALT SERPL-CCNC: 18 U/L
ANION GAP SERPL CALC-SCNC: 12 MMOL/L
APPEARANCE: CLEAR
AST SERPL-CCNC: 16 U/L
BACTERIA: ABNORMAL
BASOPHILS # BLD AUTO: 0.02 K/UL
BASOPHILS NFR BLD AUTO: 0.2 %
BILIRUB SERPL-MCNC: 0.9 MG/DL
BILIRUBIN URINE: NEGATIVE
BLOOD URINE: NEGATIVE
BUN SERPL-MCNC: 18 MG/DL
CALCIUM SERPL-MCNC: 9.5 MG/DL
CHLORIDE SERPL-SCNC: 103 MMOL/L
CHOLEST SERPL-MCNC: 155 MG/DL
CHOLEST/HDLC SERPL: 4.2 RATIO
CO2 SERPL-SCNC: 27 MMOL/L
COLOR: YELLOW
CREAT SERPL-MCNC: 1.2 MG/DL
CREAT SPEC-SCNC: 151 MG/DL
EOSINOPHIL # BLD AUTO: 0.21 K/UL
EOSINOPHIL NFR BLD AUTO: 2.5 %
ESTIMATED AVERAGE GLUCOSE: 143 MG/DL
GLUCOSE QUALITATIVE U: NEGATIVE
GLUCOSE SERPL-MCNC: 129 MG/DL
HBA1C MFR BLD HPLC: 6.6 %
HCT VFR BLD CALC: 44.8 %
HDLC SERPL-MCNC: 37 MG/DL
HGB BLD-MCNC: 14.2 G/DL
HYALINE CASTS: 1 /LPF
IMM GRANULOCYTES NFR BLD AUTO: 0.4 %
KETONES URINE: NEGATIVE
LDLC SERPL CALC-MCNC: 71 MG/DL
LEUKOCYTE ESTERASE URINE: ABNORMAL
LYMPHOCYTES # BLD AUTO: 1.64 K/UL
LYMPHOCYTES NFR BLD AUTO: 19.2 %
MAN DIFF?: NORMAL
MCHC RBC-ENTMCNC: 29.8 PG
MCHC RBC-ENTMCNC: 31.7 GM/DL
MCV RBC AUTO: 94.1 FL
MICROALBUMIN 24H UR DL<=1MG/L-MCNC: 1.3 MG/DL
MICROALBUMIN/CREAT 24H UR-RTO: 9 MG/G
MICROSCOPIC-UA: NORMAL
MONOCYTES # BLD AUTO: 0.53 K/UL
MONOCYTES NFR BLD AUTO: 6.2 %
NEUTROPHILS # BLD AUTO: 6.13 K/UL
NEUTROPHILS NFR BLD AUTO: 71.5 %
NITRITE URINE: POSITIVE
PH URINE: 6
PLATELET # BLD AUTO: 249 K/UL
POTASSIUM SERPL-SCNC: 4.5 MMOL/L
PROT SERPL-MCNC: 6.8 G/DL
PROTEIN URINE: NORMAL
RBC # BLD: 4.76 M/UL
RBC # FLD: 14.2 %
RED BLOOD CELLS URINE: 1 /HPF
SODIUM SERPL-SCNC: 142 MMOL/L
SPECIFIC GRAVITY URINE: 1.03
SQUAMOUS EPITHELIAL CELLS: 2 /HPF
T4 SERPL-MCNC: 6 UG/DL
TRIGL SERPL-MCNC: 235 MG/DL
TSH SERPL-ACNC: 1.68 UIU/ML
URATE SERPL-MCNC: 5.6 MG/DL
UROBILINOGEN URINE: NORMAL
WBC # FLD AUTO: 8.56 K/UL
WHITE BLOOD CELLS URINE: 14 /HPF

## 2020-01-02 LAB — LAMOTRIGINE SERPL-MCNC: 5.1 MCG/ML

## 2020-12-26 ENCOUNTER — RX RENEWAL (OUTPATIENT)
Age: 75
End: 2020-12-26

## 2020-12-28 ENCOUNTER — RX RENEWAL (OUTPATIENT)
Age: 75
End: 2020-12-28

## 2020-12-30 ENCOUNTER — NON-APPOINTMENT (OUTPATIENT)
Age: 75
End: 2020-12-30

## 2021-01-06 ENCOUNTER — APPOINTMENT (OUTPATIENT)
Dept: FAMILY MEDICINE | Facility: CLINIC | Age: 76
End: 2021-01-06
Payer: MEDICARE

## 2021-01-06 VITALS
DIASTOLIC BLOOD PRESSURE: 86 MMHG | SYSTOLIC BLOOD PRESSURE: 150 MMHG | BODY MASS INDEX: 30.75 KG/M2 | HEART RATE: 73 BPM | OXYGEN SATURATION: 97 % | WEIGHT: 227 LBS | HEIGHT: 72 IN | TEMPERATURE: 97.9 F

## 2021-01-06 VITALS — SYSTOLIC BLOOD PRESSURE: 160 MMHG | HEART RATE: 76 BPM | RESPIRATION RATE: 16 BRPM | DIASTOLIC BLOOD PRESSURE: 90 MMHG

## 2021-01-06 DIAGNOSIS — R79.89 OTHER SPECIFIED ABNORMAL FINDINGS OF BLOOD CHEMISTRY: ICD-10-CM

## 2021-01-06 DIAGNOSIS — K40.90 UNILATERAL INGUINAL HERNIA, W/OUT OBSTRUCTION OR GANGRENE, NOT SPECIFIED AS RECURRENT: ICD-10-CM

## 2021-01-06 PROCEDURE — 99213 OFFICE O/P EST LOW 20 MIN: CPT | Mod: 25

## 2021-01-06 PROCEDURE — G0439: CPT

## 2021-01-06 PROCEDURE — 99497 ADVNCD CARE PLAN 30 MIN: CPT | Mod: 33

## 2021-01-06 NOTE — HEALTH RISK ASSESSMENT
[Very Good] : ~his/her~  mood as very good [Yes] : Yes [Monthly or less (1 pt)] : Monthly or less (1 point) [No falls in past year] : Patient reported no falls in the past year [0] : 2) Feeling down, depressed, or hopeless: Not at all (0) [None] : None [With Family] : lives with family [Employed] : employed [College] : College [] :  [# Of Children ___] : has [unfilled] children [Fully functional (bathing, dressing, toileting, transferring, walking, feeding)] : Fully functional (bathing, dressing, toileting, transferring, walking, feeding) [Fully functional (using the telephone, shopping, preparing meals, housekeeping, doing laundry, using] : Fully functional and needs no help or supervision to perform IADLs (using the telephone, shopping, preparing meals, housekeeping, doing laundry, using transportation, managing medications and managing finances) [Reports changes in hearing] : Reports changes in hearing [Smoke Detector] : smoke detector [Carbon Monoxide Detector] : carbon monoxide detector [Seat Belt] :  uses seat belt [Patient/Caregiver unclear of wishes] : Patient/Caregiver unclear of wishes [] : No [de-identified] : urology cardiology  [de-identified] : no  [[id number]] : 0  [Change in mental status noted] : No change in mental status noted [Language] : denies difficulty with language [Reports changes in vision] : Reports no changes in vision [Reports changes in dental health] : Reports no changes in dental health [ColonoscopyDate] : 2/18 [de-identified] : consulting  [AdvancecareDate] : 1/21 [FreeTextEntry4] : 16 minutes  spent discussing  end of life care and options for treatment\par

## 2021-01-06 NOTE — PHYSICAL EXAM
[No Acute Distress] : no acute distress [PERRL] : pupils equal round and reactive to light [Normal Oropharynx] : the oropharynx was normal [Supple] : supple [Normal Rate] : normal rate  [Regular Rhythm] : with a regular rhythm [No Murmur] : no murmur heard [No Edema] : there was no peripheral edema [Normal] : no posterior cervical lymphadenopathy and no anterior cervical lymphadenopathy [No Joint Swelling] : no joint swelling [No Rash] : no rash [No Focal Deficits] : no focal deficits [Normal Gait] : normal gait [Normal Insight/Judgement] : insight and judgment were intact [FreeTextEntry1] : VALERIA  [de-identified] : rt iguinal hernia

## 2021-01-06 NOTE — HISTORY OF PRESENT ILLNESS
[Spouse] : spouse [FreeTextEntry1] : pt here for management of htn hld  seizure  and parectatic cyst  [de-identified] : pt feeling well last mri for pancreatic cyst 2019 no seizures still getting  testosterone  pellets using  c-pap

## 2021-01-06 NOTE — ASSESSMENT
[FreeTextEntry1] : bp borderline monitor at home lab evaluation\par medically stable  continue all meds\par

## 2021-01-07 ENCOUNTER — TRANSCRIPTION ENCOUNTER (OUTPATIENT)
Age: 76
End: 2021-01-07

## 2021-01-07 LAB
ALBUMIN SERPL ELPH-MCNC: 4.5 G/DL
ALP BLD-CCNC: 67 U/L
ALT SERPL-CCNC: 13 U/L
ANION GAP SERPL CALC-SCNC: 10 MMOL/L
APPEARANCE: CLEAR
AST SERPL-CCNC: 13 U/L
BACTERIA: NEGATIVE
BASOPHILS # BLD AUTO: 0.02 K/UL
BASOPHILS NFR BLD AUTO: 0.2 %
BILIRUB SERPL-MCNC: 0.9 MG/DL
BILIRUBIN URINE: NEGATIVE
BLOOD URINE: NORMAL
BUN SERPL-MCNC: 18 MG/DL
CALCIUM SERPL-MCNC: 9.8 MG/DL
CHLORIDE SERPL-SCNC: 102 MMOL/L
CHOLEST SERPL-MCNC: 146 MG/DL
CO2 SERPL-SCNC: 30 MMOL/L
COLOR: NORMAL
CREAT SERPL-MCNC: 1.3 MG/DL
CREAT SPEC-SCNC: 117 MG/DL
EOSINOPHIL # BLD AUTO: 0.16 K/UL
EOSINOPHIL NFR BLD AUTO: 1.8 %
ESTIMATED AVERAGE GLUCOSE: 137 MG/DL
GLUCOSE QUALITATIVE U: NEGATIVE
GLUCOSE SERPL-MCNC: 96 MG/DL
HBA1C MFR BLD HPLC: 6.4 %
HCT VFR BLD CALC: 48.5 %
HDLC SERPL-MCNC: 39 MG/DL
HGB BLD-MCNC: 15 G/DL
HYALINE CASTS: 0 /LPF
IMM GRANULOCYTES NFR BLD AUTO: 0.6 %
KETONES URINE: NEGATIVE
LDLC SERPL CALC-MCNC: 72 MG/DL
LEUKOCYTE ESTERASE URINE: ABNORMAL
LYMPHOCYTES # BLD AUTO: 1.59 K/UL
LYMPHOCYTES NFR BLD AUTO: 18 %
MAN DIFF?: NORMAL
MCHC RBC-ENTMCNC: 29.6 PG
MCHC RBC-ENTMCNC: 30.9 GM/DL
MCV RBC AUTO: 95.7 FL
MICROALBUMIN 24H UR DL<=1MG/L-MCNC: 1.9 MG/DL
MICROALBUMIN/CREAT 24H UR-RTO: 16 MG/G
MICROSCOPIC-UA: NORMAL
MONOCYTES # BLD AUTO: 0.45 K/UL
MONOCYTES NFR BLD AUTO: 5.1 %
NEUTROPHILS # BLD AUTO: 6.58 K/UL
NEUTROPHILS NFR BLD AUTO: 74.3 %
NITRITE URINE: POSITIVE
NONHDLC SERPL-MCNC: 107 MG/DL
PH URINE: 6
PLATELET # BLD AUTO: 241 K/UL
POTASSIUM SERPL-SCNC: 4.6 MMOL/L
PROT SERPL-MCNC: 6.9 G/DL
PROTEIN URINE: NEGATIVE
RBC # BLD: 5.07 M/UL
RBC # FLD: 15 %
RED BLOOD CELLS URINE: 1 /HPF
SODIUM SERPL-SCNC: 142 MMOL/L
SPECIFIC GRAVITY URINE: 1.02
SQUAMOUS EPITHELIAL CELLS: 2 /HPF
T4 SERPL-MCNC: 5.9 UG/DL
TRIGL SERPL-MCNC: 171 MG/DL
TSH SERPL-ACNC: 1.45 UIU/ML
URATE SERPL-MCNC: 6 MG/DL
UROBILINOGEN URINE: NORMAL
WBC # FLD AUTO: 8.85 K/UL
WHITE BLOOD CELLS URINE: 9 /HPF

## 2021-01-11 ENCOUNTER — TRANSCRIPTION ENCOUNTER (OUTPATIENT)
Age: 76
End: 2021-01-11

## 2021-01-13 LAB — LAMOTRIGINE SERPL-MCNC: 5.3 UG/ML

## 2021-01-14 ENCOUNTER — TRANSCRIPTION ENCOUNTER (OUTPATIENT)
Age: 76
End: 2021-01-14

## 2021-01-18 ENCOUNTER — TRANSCRIPTION ENCOUNTER (OUTPATIENT)
Age: 76
End: 2021-01-18

## 2021-01-19 ENCOUNTER — TRANSCRIPTION ENCOUNTER (OUTPATIENT)
Age: 76
End: 2021-01-19

## 2021-01-25 ENCOUNTER — TRANSCRIPTION ENCOUNTER (OUTPATIENT)
Age: 76
End: 2021-01-25

## 2021-02-04 ENCOUNTER — TRANSCRIPTION ENCOUNTER (OUTPATIENT)
Age: 76
End: 2021-02-04

## 2021-02-04 LAB — HEMOCCULT STL QL IA: NEGATIVE

## 2021-02-09 ENCOUNTER — TRANSCRIPTION ENCOUNTER (OUTPATIENT)
Age: 76
End: 2021-02-09

## 2021-02-10 ENCOUNTER — TRANSCRIPTION ENCOUNTER (OUTPATIENT)
Age: 76
End: 2021-02-10

## 2021-03-02 ENCOUNTER — APPOINTMENT (OUTPATIENT)
Dept: FAMILY MEDICINE | Facility: CLINIC | Age: 76
End: 2021-03-02
Payer: MEDICARE

## 2021-03-02 PROCEDURE — 99213 OFFICE O/P EST LOW 20 MIN: CPT | Mod: 95

## 2021-03-02 NOTE — ASSESSMENT
[FreeTextEntry1] : pt advise to only take bp once a day and vary time of day decrease  metoprolol to 150 mg at nigh and take valsartan/hct in am come in office in 1 mo and  bring bp machine but  bp machine is 15 yrs old pt advised to get new machine

## 2021-03-02 NOTE — HISTORY OF PRESENT ILLNESS
[Home] : at home, [unfilled] , at the time of the visit. [Medical Office: ([facility])___] : at the medical office located in  [Verbal consent obtained from patient] : the patient, [unfilled] [FreeTextEntry1] : has  been taking  bp at home  and and saw  cardiology and bp was ok  pt bp at home and  machine might not be accurate

## 2021-03-17 ENCOUNTER — APPOINTMENT (OUTPATIENT)
Dept: FAMILY MEDICINE | Facility: CLINIC | Age: 76
End: 2021-03-17
Payer: MEDICARE

## 2021-03-17 ENCOUNTER — NON-APPOINTMENT (OUTPATIENT)
Age: 76
End: 2021-03-17

## 2021-03-17 VITALS
OXYGEN SATURATION: 96 % | HEART RATE: 76 BPM | SYSTOLIC BLOOD PRESSURE: 140 MMHG | BODY MASS INDEX: 29.53 KG/M2 | TEMPERATURE: 97.6 F | HEIGHT: 72 IN | WEIGHT: 218 LBS | DIASTOLIC BLOOD PRESSURE: 86 MMHG

## 2021-03-17 VITALS — DIASTOLIC BLOOD PRESSURE: 80 MMHG | SYSTOLIC BLOOD PRESSURE: 158 MMHG | HEART RATE: 72 BPM | RESPIRATION RATE: 16 BRPM

## 2021-03-17 DIAGNOSIS — Z87.438 PERSONAL HISTORY OF OTHER DISEASES OF MALE GENITAL ORGANS: ICD-10-CM

## 2021-03-17 DIAGNOSIS — I34.0 NONRHEUMATIC MITRAL (VALVE) INSUFFICIENCY: ICD-10-CM

## 2021-03-17 PROCEDURE — 99214 OFFICE O/P EST MOD 30 MIN: CPT | Mod: 25

## 2021-03-17 PROCEDURE — 93000 ELECTROCARDIOGRAM COMPLETE: CPT | Mod: 59

## 2021-03-17 NOTE — REVIEW OF SYSTEMS
[Chest Pain] : no chest pain [Palpitations] : no palpitations [Shortness Of Breath] : no shortness of breath [Constipation] : no constipation [Diarrhea] : no diarrhea [Dysuria] : no dysuria [Memory Loss] : memory loss [Negative] : Heme/Lymph

## 2021-03-17 NOTE — PHYSICAL EXAM
[No Acute Distress] : no acute distress [PERRL] : pupils equal round and reactive to light [Normal TMs] : both tympanic membranes were normal [Supple] : supple [Clear to Auscultation] : lungs were clear to auscultation bilaterally [Regular Rhythm] : with a regular rhythm [No Edema] : there was no peripheral edema [Normal] : soft, non-tender, non-distended, no masses palpated, no HSM and normal bowel sounds [Normal Supraclavicular Nodes] : no supraclavicular lymphadenopathy [Normal Posterior Cervical Nodes] : no posterior cervical lymphadenopathy [Normal Anterior Cervical Nodes] : no anterior cervical lymphadenopathy [No CVA Tenderness] : no CVA  tenderness [No Joint Swelling] : no joint swelling [No Rash] : no rash [No Focal Deficits] : no focal deficits [Normal Insight/Judgement] : insight and judgment were intact

## 2021-03-17 NOTE — ASSESSMENT
[High Risk Surgery - Intraperitoneal, Intrathoracic or Supringuinal Vascular Procedures] : High Risk Surgery - Intraperitoneal, Intrathoracic or Supringuinal Vascular Procedures - No (0) [Ischemic Heart Disease] : Ischemic Heart Disease - No (0) [Congestive Heart Failure] : Congestive Heart Failure - No (0) [Prior Cerebrovascular Accident or TIA] : Prior Cerebrovascular Accident or TIA - No (0) [Creatinine >= 2mg/dL (1 Point)] : Creatinine >= 2mg/dL - No (0) [Insulin-dependent Diabetic (1 Point)] : Insulin-dependent Diabetic - No (0) [0] : 0 , RCRI Class: I, Risk of Post-Op Cardiac Complications: 3.9%, 95% CI for Risk Estimate: 2.8% - 5.4% [Patient Optimized for Surgery] : Patient optimized for surgery [No Further Testing Recommended] : no further testing recommended [FreeTextEntry4] : 75 yr old  male  with htn hld mitral valve repair sleep apnea and  seizure disorder all stable on meds acceptable medical condition for surgery\par

## 2021-03-17 NOTE — HISTORY OF PRESENT ILLNESS
[COPD] : COPD [Sleep Apnea] : sleep apnea [Diabetes] : diabetes [(Patient denies any chest pain, claudication, dyspnea on exertion, orthopnea, palpitations or syncope)] : Patient denies any chest pain, claudication, dyspnea on exertion, orthopnea, palpitations or syncope [Moderate (4-6 METs)] : Moderate (4-6 METs) [Aortic Stenosis] : no aortic stenosis [Atrial Fibrillation] : no atrial fibrillation [Coronary Artery Disease] : no coronary artery disease [Recent Myocardial Infarction] : no recent myocardial infarction [Implantable Device/Pacemaker] : no implantable device/pacemaker [Asthma] : no asthma [Smoker] : not a smoker [Family Member] : no family member with adverse anesthesia reaction/sudden death [Self] : no previous adverse anesthesia reaction [Chronic Anticoagulation] : no chronic anticoagulation [Chronic Kidney Disease] : no chronic kidney disease [FreeTextEntry1] : prostate bio  [FreeTextEntry2] : 3/24/21 [FreeTextEntry3] : Dr. Jama  [FreeTextEntry4] : pt is a 75 yr old male here for clearance for prostate biopsy  [FreeTextEntry5] : mitral valve  repair

## 2021-03-18 LAB
APPEARANCE: CLEAR
BACTERIA: ABNORMAL
BILIRUBIN URINE: NEGATIVE
BLOOD URINE: NORMAL
COLOR: NORMAL
GLUCOSE QUALITATIVE U: NEGATIVE
HYALINE CASTS: 0 /LPF
KETONES URINE: NEGATIVE
LEUKOCYTE ESTERASE URINE: NEGATIVE
MICROSCOPIC-UA: NORMAL
NITRITE URINE: POSITIVE
PH URINE: 5.5
PROTEIN URINE: NEGATIVE
RED BLOOD CELLS URINE: 8 /HPF
SPECIFIC GRAVITY URINE: 1.02
SQUAMOUS EPITHELIAL CELLS: 1 /HPF
UROBILINOGEN URINE: NORMAL
WHITE BLOOD CELLS URINE: 13 /HPF

## 2021-03-20 LAB — BACTERIA UR CULT: ABNORMAL

## 2021-03-24 ENCOUNTER — EMERGENCY (EMERGENCY)
Facility: HOSPITAL | Age: 76
LOS: 0 days | Discharge: HOME | End: 2021-03-24
Attending: EMERGENCY MEDICINE
Payer: MEDICARE

## 2021-03-24 VITALS
OXYGEN SATURATION: 97 % | DIASTOLIC BLOOD PRESSURE: 76 MMHG | RESPIRATION RATE: 18 BRPM | SYSTOLIC BLOOD PRESSURE: 145 MMHG | TEMPERATURE: 98 F | HEART RATE: 72 BPM

## 2021-03-24 VITALS — WEIGHT: 214.95 LBS | HEIGHT: 72 IN

## 2021-03-24 DIAGNOSIS — Z98.890 OTHER SPECIFIED POSTPROCEDURAL STATES: ICD-10-CM

## 2021-03-24 DIAGNOSIS — N17.9 ACUTE KIDNEY FAILURE, UNSPECIFIED: ICD-10-CM

## 2021-03-24 DIAGNOSIS — Z87.442 PERSONAL HISTORY OF URINARY CALCULI: ICD-10-CM

## 2021-03-24 DIAGNOSIS — Z91.09 OTHER ALLERGY STATUS, OTHER THAN TO DRUGS AND BIOLOGICAL SUBSTANCES: ICD-10-CM

## 2021-03-24 DIAGNOSIS — R33.9 RETENTION OF URINE, UNSPECIFIED: ICD-10-CM

## 2021-03-24 LAB
ALBUMIN SERPL ELPH-MCNC: 3.7 G/DL — SIGNIFICANT CHANGE UP (ref 3.3–5)
ALP SERPL-CCNC: 64 U/L — SIGNIFICANT CHANGE UP (ref 40–120)
ALT FLD-CCNC: 21 U/L — SIGNIFICANT CHANGE UP (ref 12–78)
ANION GAP SERPL CALC-SCNC: 8 MMOL/L — SIGNIFICANT CHANGE UP (ref 5–17)
APPEARANCE UR: ABNORMAL
AST SERPL-CCNC: 14 U/L — LOW (ref 15–37)
BASOPHILS # BLD AUTO: 0.01 K/UL — SIGNIFICANT CHANGE UP (ref 0–0.2)
BASOPHILS NFR BLD AUTO: 0.1 % — SIGNIFICANT CHANGE UP (ref 0–2)
BILIRUB SERPL-MCNC: 0.7 MG/DL — SIGNIFICANT CHANGE UP (ref 0.2–1.2)
BILIRUB UR-MCNC: NEGATIVE — SIGNIFICANT CHANGE UP
BUN SERPL-MCNC: 30 MG/DL — HIGH (ref 7–23)
CALCIUM SERPL-MCNC: 8.7 MG/DL — SIGNIFICANT CHANGE UP (ref 8.5–10.1)
CHLORIDE SERPL-SCNC: 103 MMOL/L — SIGNIFICANT CHANGE UP (ref 96–108)
CO2 SERPL-SCNC: 26 MMOL/L — SIGNIFICANT CHANGE UP (ref 22–31)
COLOR SPEC: YELLOW — SIGNIFICANT CHANGE UP
CREAT SERPL-MCNC: 1.86 MG/DL — HIGH (ref 0.5–1.3)
DIFF PNL FLD: ABNORMAL
EOSINOPHIL # BLD AUTO: 0 K/UL — SIGNIFICANT CHANGE UP (ref 0–0.5)
EOSINOPHIL NFR BLD AUTO: 0 % — SIGNIFICANT CHANGE UP (ref 0–6)
GLUCOSE SERPL-MCNC: 129 MG/DL — HIGH (ref 70–99)
GLUCOSE UR QL: NEGATIVE MG/DL — SIGNIFICANT CHANGE UP
HCT VFR BLD CALC: 41.2 % — SIGNIFICANT CHANGE UP (ref 39–50)
HGB BLD-MCNC: 13.3 G/DL — SIGNIFICANT CHANGE UP (ref 13–17)
IMM GRANULOCYTES NFR BLD AUTO: 0.5 % — SIGNIFICANT CHANGE UP (ref 0–1.5)
KETONES UR-MCNC: NEGATIVE — SIGNIFICANT CHANGE UP
LEUKOCYTE ESTERASE UR-ACNC: ABNORMAL
LYMPHOCYTES # BLD AUTO: 0.88 K/UL — LOW (ref 1–3.3)
LYMPHOCYTES # BLD AUTO: 7.1 % — LOW (ref 13–44)
MCHC RBC-ENTMCNC: 30 PG — SIGNIFICANT CHANGE UP (ref 27–34)
MCHC RBC-ENTMCNC: 32.3 GM/DL — SIGNIFICANT CHANGE UP (ref 32–36)
MCV RBC AUTO: 92.8 FL — SIGNIFICANT CHANGE UP (ref 80–100)
MONOCYTES # BLD AUTO: 0.51 K/UL — SIGNIFICANT CHANGE UP (ref 0–0.9)
MONOCYTES NFR BLD AUTO: 4.1 % — SIGNIFICANT CHANGE UP (ref 2–14)
NEUTROPHILS # BLD AUTO: 10.94 K/UL — HIGH (ref 1.8–7.4)
NEUTROPHILS NFR BLD AUTO: 88.2 % — HIGH (ref 43–77)
NITRITE UR-MCNC: NEGATIVE — SIGNIFICANT CHANGE UP
PH UR: 5 — SIGNIFICANT CHANGE UP (ref 5–8)
PLATELET # BLD AUTO: 230 K/UL — SIGNIFICANT CHANGE UP (ref 150–400)
POTASSIUM SERPL-MCNC: 4.1 MMOL/L — SIGNIFICANT CHANGE UP (ref 3.5–5.3)
POTASSIUM SERPL-SCNC: 4.1 MMOL/L — SIGNIFICANT CHANGE UP (ref 3.5–5.3)
PROT SERPL-MCNC: 7.4 GM/DL — SIGNIFICANT CHANGE UP (ref 6–8.3)
PROT UR-MCNC: 30 MG/DL
RBC # BLD: 4.44 M/UL — SIGNIFICANT CHANGE UP (ref 4.2–5.8)
RBC # FLD: 13.6 % — SIGNIFICANT CHANGE UP (ref 10.3–14.5)
SODIUM SERPL-SCNC: 137 MMOL/L — SIGNIFICANT CHANGE UP (ref 135–145)
SP GR SPEC: 1.01 — SIGNIFICANT CHANGE UP (ref 1.01–1.02)
UROBILINOGEN FLD QL: NEGATIVE MG/DL — SIGNIFICANT CHANGE UP
WBC # BLD: 12.4 K/UL — HIGH (ref 3.8–10.5)
WBC # FLD AUTO: 12.4 K/UL — HIGH (ref 3.8–10.5)

## 2021-03-24 PROCEDURE — 99283 EMERGENCY DEPT VISIT LOW MDM: CPT | Mod: 25

## 2021-03-24 PROCEDURE — 99284 EMERGENCY DEPT VISIT MOD MDM: CPT

## 2021-03-24 PROCEDURE — 85025 COMPLETE CBC W/AUTO DIFF WBC: CPT

## 2021-03-24 PROCEDURE — 51702 INSERT TEMP BLADDER CATH: CPT

## 2021-03-24 PROCEDURE — 80053 COMPREHEN METABOLIC PANEL: CPT

## 2021-03-24 PROCEDURE — 87086 URINE CULTURE/COLONY COUNT: CPT

## 2021-03-24 PROCEDURE — 96360 HYDRATION IV INFUSION INIT: CPT | Mod: XU

## 2021-03-24 PROCEDURE — 36415 COLL VENOUS BLD VENIPUNCTURE: CPT

## 2021-03-24 PROCEDURE — 81001 URINALYSIS AUTO W/SCOPE: CPT

## 2021-03-24 RX ORDER — SODIUM CHLORIDE 9 MG/ML
1000 INJECTION INTRAMUSCULAR; INTRAVENOUS; SUBCUTANEOUS ONCE
Refills: 0 | Status: COMPLETED | OUTPATIENT
Start: 2021-03-24 | End: 2021-03-24

## 2021-03-24 RX ADMIN — SODIUM CHLORIDE 1000 MILLILITER(S): 9 INJECTION INTRAMUSCULAR; INTRAVENOUS; SUBCUTANEOUS at 18:30

## 2021-03-24 RX ADMIN — SODIUM CHLORIDE 2000 MILLILITER(S): 9 INJECTION INTRAMUSCULAR; INTRAVENOUS; SUBCUTANEOUS at 17:30

## 2021-03-24 NOTE — ED STATDOCS - PHYSICAL EXAMINATION
Constitutional: Uncomfortable M standing in room, AAOx3  Eyes: PERRLA EOMI  Head: Normocephalic atraumatic  Mouth: MMM  Cardiac: regular rate   Resp: Lungs CTAB  GI: Abd s/nt/nd, no rebound or guarding.  Neuro: awake, alert, moving all extremities, cranial nerves 2-12 intact, sensation intact, no dysmetria.  Skin: No rashes Constitutional: Uncomfortable 74 y/o M standing in room, AAOx3  Eyes: PERRLA EOMI  Head: Normocephalic atraumatic  Mouth: MMM  Cardiac: regular rate   Resp: Lungs CTAB  GI: Abd s/nt/nd, no rebound or guarding.  Neuro: awake, alert, moving all extremities, cranial nerves 2-12 intact, sensation intact, no dysmetria.  Skin: No rashes

## 2021-03-24 NOTE — ED STATDOCS - CHPI ED RELIEVING FACTORS
nothing met pt, confirmed hcp & copy of molst from EMR last admission. pt daughter is hcpMelissa: contacted Melissa on phone, will provide original molst form. pt confirms no change: pt is dnr dni no TF, wants treatment: IVF& antibiotics. Order received from Dr Delgado. contact # given

## 2021-03-24 NOTE — ED STATDOCS - NS ED ROS FT
Constitutional: No fever or chills  Eyes: No visual changes  HEENT: No throat pain  CV: No chest pain  Resp: No SOB no cough  GI: No abd pain, nausea or vomiting  : No dysuria +urinary retention   MSK: No musculoskeletal pain  Skin: No rash  Neuro: No headache

## 2021-03-24 NOTE — ED ADULT TRIAGE NOTE - CHIEF COMPLAINT QUOTE
Pt arrives to ED complaining of urinary retention. pt had bladder stone removal and bladder biopsy done today. has not voided in six hours.

## 2021-03-24 NOTE — ED STATDOCS - OBJECTIVE STATEMENT
76 y/o M with PMHx of kidney stones presents to the ED c/o urinary retention. Patient reports that he had prostate biopsy done today with removal of stones in his bladder with Dr. Robertson. Patient states that since then he has not had any urinary output x6 hours. Patient came to the ED for further evaluation. Denies any N/V/D, fever or chills.

## 2021-03-24 NOTE — ED STATDOCS - PATIENT PORTAL LINK FT
You can access the FollowMyHealth Patient Portal offered by [facility] by registering at the following website: [email or web address]. By joining [employer]’s FollowMyHealth portal, you will also be able to view your health information using other applications (apps) compatible with our system.

## 2021-03-24 NOTE — ED ADULT NURSE NOTE - OBJECTIVE STATEMENT
patient axox3, c/o urinary retention. patient had bladder stone removal and bladder biopsy done today. last void was six hours PTA. patient reports that he had prostate biopsy done today with removal of stones in his bladder with Dr. Robertson. patient denies headache, n/v/d, fever, chills, cough, chest pain, SOB.

## 2021-03-24 NOTE — ED STATDOCS - CLINICAL SUMMARY MEDICAL DECISION MAKING FREE TEXT BOX
75 M presents to the ED s/p kidney stone removal and prostate biopsy with Dr. Negrete presents with urinary retention. Appears uncomfortable, has had catheter placed in the past. Plan: Replace catheter, UA, f/u with Urologist.

## 2021-03-24 NOTE — ED STATDOCS - SCRIBE NAME
A woman from the hospital called to say that Haleigh Singletary has a "critical value" - his Sienna Sullivan was 16 20 and she deemed it as "high " I am not sure if there was anything I had to do with this information so I am just passing it along  LMK if I need to schedule him to come in or anything!
Noted, will call and notify family, better result 
Lukasz Parry

## 2021-03-24 NOTE — ED STATDOCS - NSFOLLOWUPINSTRUCTIONS_ED_ALL_ED_FT
Dowling Catheter Placement and Care    AMBULATORY CARE:    A Dowling catheter is a sterile tube that is inserted into your bladder to drain urine. It is also called an indwelling urinary catheter. The tip of the catheter has a small balloon filled with solution that holds the catheter in your bladder.                    How a Dowling catheter is placed: Your genital area will be cleaned to prevent infection. The catheter will be inserted into your urethra. When urine begins to flow into the tubing, the balloon is filled to keep the catheter in place. Then, the open end will be attached to a drainage bag.     Seek care immediately if:   •Your catheter comes out.       •You suddenly have material that looks like sand in the tubing or drainage bag.      •No urine is draining into the bag and you have checked the system.      •You have pain in your hip, back, pelvis, or lower abdomen.      •You are confused or cannot think clearly.      Call your doctor or urologist if:   •You have a fever.      •You have bladder spasms for more than 1 day after the catheter is placed.      •You see blood in the tubing or drainage bag.      •You have a rash or itching where the catheter tube is secured to your skin.      •Urine leaks from or around the catheter, tubing, or drainage bag.      •The closed drainage system has accidently come open or apart.       •You see a layer of crystals inside the tubing.      •You have questions or concerns about your condition or care.      Care for your catheter and drainage bag: You can reduce your risk for infection and injury by caring for your catheter and drainage bag properly.  •Wash your hands often. Wash before and after you touch your catheter, tubing, or drainage bag. Use soap and water. Wear clean disposable gloves when you care for your catheter or disconnect the drainage bag. Wash your hands before you prepare or eat food.   Handwashing           •Clean your genital area 2 times every day. Clean your catheter area and anal opening after every bowel movement. ?For men: Use a soapy cloth to clean the tip of your penis. Start where the catheter enters. Wipe backward making sure to pull back the foreskin. Then use a cloth with clear water in the same direction to clean away the soap.       ?For women: Use a soapy cloth to clean the area that the catheter enters your body. Make sure to separate your labia and wipe toward the anus. Then use a cloth with clear water and wipe in the same direction.       •Secure the catheter tube so you do not pull or move the catheter. This helps prevent pain and bladder spasms. Healthcare providers will show you how to use medical tape or a strap to secure the catheter tube to your body.       •Keep a closed drainage system. Your catheter should always be attached to the drainage bag to form a closed system. Do not disconnect any part of the closed system unless you need to change the bag.      •Keep the drainage bag below the level of your waist. This helps stop urine from moving back up the tubing and into your bladder. Do not loop or kink the tubing. This can cause urine to back up and collect in your bladder. Do not let the drainage bag touch or lie on the floor.      •Empty the drainage bag when needed. The weight of a full drainage bag can be painful. Empty the drainage bag every 3 to 6 hours or when it is ? full.       •Clean and change the drainage bag as directed. Ask your healthcare provider how often you should change the drainage bag and what cleaning solution to use. Wear disposable gloves when you change the bag. Do not allow the end of the catheter or tubing to touch anything. Clean the ends with an alcohol pad before you reconnect them.      What to do if problems develop:   •No urine is draining into the bag: ?Check for kinks in the tubing and straighten them out.       ?Check the tape or strap used to secure the catheter tube to your skin. Make sure it is not blocking the tube.       ?Make sure you are not sitting or lying on the tubing.      ?Make sure the urine bag is hanging below the level of your waist.      •Urine leaks from or around the catheter, tubing, or drainage bag: Check if the closed drainage system has accidently come open or apart. Clean the catheter and tubing ends with a new alcohol pad and reconnect them.       Follow up with your doctor or urologist as directed: Write down your questions so you remember to ask them during your visits.      Acute Kidney Injury    WHAT YOU NEED TO KNOW:    Acute kidney injury (MARYCRUZ) is also called acute kidney failure, or acute renal failure. MARYCRUZ happens when your kidneys suddenly stop working correctly. Normally, the kidneys remove fluid, chemicals, and waste from your blood. These wastes are turned into urine by your kidneys. MARYCRUZ usually happens over hours or days. When you have MARYCRUZ, your kidneys do not remove the waste, chemicals, or extra fluid from your body. A normal amount of urine is not produced. MARYCRUZ is usually temporary, it can take days to months to recover. MARYCRUZ can also become a chronic kidney condition.     DISCHARGE INSTRUCTIONS:    Call 911 if:   •You have sudden chest pain or trouble breathing.           Seek care immediately if:   •Your symptoms get worse.           Contact your healthcare provider if:   •Your symptoms return.      •Your blood sugar or blood pressure level is not within the range your healthcare provider recommends.      •You have questions or concerns about your condition or care.      Nutrition: Your healthcare provider may tell you to eat food low in sodium (salt), potassium, phosphorus, or protein. A dietitian can help you plan your meals.     Drink liquids as directed: Your healthcare provider may recommend that you drink a certain amount of liquids. This will help your kidneys work better and decrease your risk for dehydration. Ask how much liquid to drink each day and which liquids are best for you.    Prevent acute kidney injury:   •Manage other health conditions such as diabetes, high blood pressure, or heart disease. These conditions increase your risk for acute kidney injury. Take your medicines for these conditions as directed. Also, monitor your blood sugar and blood pressure levels as directed. Contact your healthcare provider if your levels are not in the range he or she says it should be.      •Talk to your healthcare provider before you take over-the-counter-medicine. NSAIDs, stomach medicine, or laxatives may harm your kidneys and increase your risk for acute kidney injury. If it is okay to take the medicine, follow the directions on the package. Do not take more than directed.       •Tell healthcare providers you have had acute kidney injury before you get contrast liquid for an x-ray or CT scan. Your healthcare provider may give you medicine to prevent kidney problems caused by the liquid.       Follow up with your healthcare provider as directed: You will need to return for more tests to make sure your kidneys are working properly. You may also be referred to a kidney specialist. Write down your questions so you remember to ask them during your visits.

## 2021-03-24 NOTE — ED STATDOCS - PROGRESS NOTE DETAILS
alexi saleh, pt feeling much bettter, awaiting UA and labs  Silva Covington PA-C UA w/o signs of infection, labs with elevated Cr 1.8, will give IV fluid, advised repeat with PMD, pt has a urologist appointment scheduled for tomorrow   Silva Covington PA-C

## 2021-03-24 NOTE — ED STATDOCS - ATTENDING CONTRIBUTION TO CARE
I, Caro Vigil MD, performed the initial face to face bedside interview with this patient regarding history of present illness, review of symptoms and relevant past medical, social and family history.  I completed an independent physical examination.  I was the initial provider who evaluated this patient. I have signed out the follow up of any pending tests (i.e. labs, radiological studies) to the ACP.  I have communicated the patient’s plan of care and disposition with the ACP.  The history, relevant review of systems, past medical and surgical history, medical decision making, and physical examination was documented by the scribe in my presence and I attest to the accuracy of the documentation.

## 2021-03-25 LAB
CULTURE RESULTS: NO GROWTH — SIGNIFICANT CHANGE UP
SPECIMEN SOURCE: SIGNIFICANT CHANGE UP

## 2021-03-26 ENCOUNTER — EMERGENCY (EMERGENCY)
Facility: HOSPITAL | Age: 76
LOS: 0 days | Discharge: HOME | End: 2021-03-26
Attending: EMERGENCY MEDICINE
Payer: MEDICARE

## 2021-03-26 VITALS
TEMPERATURE: 99 F | RESPIRATION RATE: 19 BRPM | HEART RATE: 84 BPM | DIASTOLIC BLOOD PRESSURE: 79 MMHG | SYSTOLIC BLOOD PRESSURE: 174 MMHG | OXYGEN SATURATION: 97 %

## 2021-03-26 VITALS — HEIGHT: 72 IN | WEIGHT: 184.97 LBS

## 2021-03-26 DIAGNOSIS — N30.01 ACUTE CYSTITIS WITH HEMATURIA: ICD-10-CM

## 2021-03-26 DIAGNOSIS — Z98.890 OTHER SPECIFIED POSTPROCEDURAL STATES: ICD-10-CM

## 2021-03-26 DIAGNOSIS — R33.9 RETENTION OF URINE, UNSPECIFIED: ICD-10-CM

## 2021-03-26 DIAGNOSIS — Z87.442 PERSONAL HISTORY OF URINARY CALCULI: ICD-10-CM

## 2021-03-26 LAB
APPEARANCE UR: CLEAR — SIGNIFICANT CHANGE UP
BILIRUB UR-MCNC: NEGATIVE — SIGNIFICANT CHANGE UP
COLOR SPEC: YELLOW — SIGNIFICANT CHANGE UP
DIFF PNL FLD: ABNORMAL
GLUCOSE UR QL: NEGATIVE MG/DL — SIGNIFICANT CHANGE UP
KETONES UR-MCNC: NEGATIVE — SIGNIFICANT CHANGE UP
LEUKOCYTE ESTERASE UR-ACNC: ABNORMAL
NITRITE UR-MCNC: NEGATIVE — SIGNIFICANT CHANGE UP
PH UR: 5 — SIGNIFICANT CHANGE UP (ref 5–8)
PROT UR-MCNC: 15 MG/DL
SP GR SPEC: 1 — LOW (ref 1.01–1.02)
UROBILINOGEN FLD QL: NEGATIVE MG/DL — SIGNIFICANT CHANGE UP

## 2021-03-26 PROCEDURE — 51702 INSERT TEMP BLADDER CATH: CPT

## 2021-03-26 PROCEDURE — 81001 URINALYSIS AUTO W/SCOPE: CPT

## 2021-03-26 PROCEDURE — 99283 EMERGENCY DEPT VISIT LOW MDM: CPT | Mod: 25

## 2021-03-26 PROCEDURE — 99283 EMERGENCY DEPT VISIT LOW MDM: CPT

## 2021-03-26 PROCEDURE — 87086 URINE CULTURE/COLONY COUNT: CPT

## 2021-03-26 RX ORDER — CEPHALEXIN 500 MG
500 CAPSULE ORAL DAILY
Refills: 0 | Status: DISCONTINUED | OUTPATIENT
Start: 2021-03-26 | End: 2021-03-26

## 2021-03-26 RX ORDER — CEPHALEXIN 500 MG
1 CAPSULE ORAL
Qty: 14 | Refills: 0
Start: 2021-03-26 | End: 2021-04-01

## 2021-03-26 RX ADMIN — Medication 500 MILLIGRAM(S): at 23:50

## 2021-03-26 NOTE — ED STATDOCS - GASTROINTESTINAL, MLM
abdomen soft +Mild diffuse abdominal tenderness, abdomen distended. abdomen soft +Mild diffuse abdominal tenderness,  mildly distended.

## 2021-03-26 NOTE — ED STATDOCS - ATTENDING CONTRIBUTION TO CARE
I, Sathya Willams, performed the initial face to face bedside interview with this patient regarding history of present illness, review of symptoms and relevant past medical, social and family history.  I completed an independent physical examination.  I was the initial provider who evaluated this patient. I have signed out the follow up of any pending tests (i.e. labs, radiological studies) to the ACP.  I have communicated the patient’s plan of care and disposition with the ACP.  The history, relevant review of systems, past medical and surgical history, medical decision making, and physical examination was documented by the scribe in my presence and I attest to the accuracy of the documentation.     pt with urinary retention and hematuria.   states truong placed 2 days ago in ED and removed this AM by his urologist with no output since this AM.   pt visibly uncomfortable.   abd soft with mild distention.   will place truong, UA, UC, and reeval

## 2021-03-26 NOTE — ED STATDOCS - PROGRESS NOTE DETAILS
Pt with urinary retention s/p prostate biopsy on 3/24.  Same thing happened on evening of 3/24 and pt came to [facility].  Truong was placed, and pt dc home.  Dr. Hood removed the truong today in the office.  Pt reprots urinating a little at a time through day.  Returned to ED [facility].  Truong catheter replaced with large amount of urine in bag.  U/A with evidence of infection now with blood, bacteria and 6 -10 WBC.  Will give Kefelx and dc home to continue.  Will call Dr. hood for F?U.  Venecia Tinajero PA-C

## 2021-03-26 NOTE — ED STATDOCS - OBJECTIVE STATEMENT
74 y/o male with a PMHx of kidney stones s/p prostate biopsy and removal of stones in bladder with Dr. Negrete 2 days ago, presents to the ED c/o urinary retention today. Pt was seen at [facility] 2 days ago c/o urinary retention, had Dowling placed, Dx with UTI and MARYCRUZ, and was discharged home. Pt had Dowling catheter removed this morning. Notes urinating a small amount earlier this morning but no output since. Denies fever. No other complaints at this time. NKDA. PCP: Bob Diamond

## 2021-03-26 NOTE — ED ADULT TRIAGE NOTE - CHIEF COMPLAINT QUOTE
patient had prostate biopsy done on Wednesday, sent home with Dowling catheter, catheter removed and patient is complaining of urinary retention and mild hematuria.

## 2021-03-26 NOTE — ED STATDOCS - NSFOLLOWUPINSTRUCTIONS_ED_ALL_ED_FT
[facility]  	                       URINARY RETENTION IN MEN - AfterCare(R) Instructions(ER/ED)           Urinary Retention in Men    WHAT YOU NEED TO KNOW:    Urinary retention is a condition that develops when your bladder does not empty completely when you urinate.     Male Reproductive System         DISCHARGE INSTRUCTIONS:    Medicines:   •Medicines can help decrease the size of your prostate, fight infection, and help you urinate more easily.      •Take your medicine as directed. Contact your healthcare provider if you think your medicine is not helping or if you have side effects. Tell him or her if you are allergic to any medicine. Keep a list of the medicines, vitamins, and herbs you take. Include the amounts, and when and why you take them. Bring the list or the pill bottles to follow-up visits. Carry your medicine list with you in case of an emergency.      Dowling catheter care: You may need a Dowling catheter for up to 2 weeks at home. Healthcare providers will give you a smaller leg bag to collect urine. Keep the bag below your waist. This will prevent urine from flowing back into your bladder and causing an infection or other problems. Also, keep the tube free of kinks so the urine will drain properly. Do not pull on the catheter. This can cause pain and bleeding, and may cause the catheter to come out. Ask your healthcare provider or urologist for more information on Dowling catheter care.    Urinate regularly: When your catheter is removed, do not let your bladder become too full before you urinate. Set regular times each day to urinate. Urinate as soon as you feel the need or at least every 3 hours while you are awake. Do not drink liquids before you go to bed. Urinate right before you go to bed.    Follow up with your healthcare provider or urologist as directed: Write down your questions so you remember to ask them during your visits.     Contact your healthcare provider or urologist if:   •You have a fever.      •You have pain when you urinate.      •You have blood in your urine.      •You have problems with your catheter.      •You have questions or concerns about your condition or care.      Return to the emergency department if:   •You have severe abdominal pain.      •You are breathing faster than usual.      •Your heartbeat is faster than usual.      •Your face, hands, feet, or ankles are swollen.          © Copyright [employer] 2021           back to top                          © Copyright [employer] 2021

## 2021-03-26 NOTE — ED ADULT NURSE NOTE - OBJECTIVE STATEMENT
pt presents to the ED c/o urinary retention today. Pt was seen at [facility] 2 days ago c/o urinary retention, had Dowling placed, Dx with UTI and MARYCRUZ, and was discharged home. Pt had Dowling catheter removed this morning. Notes urinating a small amount earlier this morning but no output since.

## 2021-03-26 NOTE — ED STATDOCS - PATIENT PORTAL LINK FT
You can access the FollowMyHealth Patient Portal offered by [facility] by registering at the following website: [email or web address]. By joining [facility]’s FollowMyHealth portal, you will also be able to view your health information using other applications (apps) compatible with our system.

## 2021-03-27 LAB
CULTURE RESULTS: NO GROWTH — SIGNIFICANT CHANGE UP
SPECIMEN SOURCE: SIGNIFICANT CHANGE UP

## 2021-07-12 PROBLEM — N20.0 CALCULUS OF KIDNEY: Chronic | Status: ACTIVE | Noted: 2021-03-26

## 2021-07-19 ENCOUNTER — APPOINTMENT (OUTPATIENT)
Dept: FAMILY MEDICINE | Facility: CLINIC | Age: 76
End: 2021-07-19
Payer: MEDICARE

## 2021-07-19 VITALS — HEART RATE: 60 BPM | SYSTOLIC BLOOD PRESSURE: 124 MMHG | RESPIRATION RATE: 16 BRPM | DIASTOLIC BLOOD PRESSURE: 70 MMHG

## 2021-07-19 VITALS
HEART RATE: 64 BPM | SYSTOLIC BLOOD PRESSURE: 132 MMHG | HEIGHT: 72 IN | OXYGEN SATURATION: 97 % | WEIGHT: 219 LBS | DIASTOLIC BLOOD PRESSURE: 88 MMHG | TEMPERATURE: 97.9 F | BODY MASS INDEX: 29.66 KG/M2

## 2021-07-19 PROCEDURE — 99214 OFFICE O/P EST MOD 30 MIN: CPT

## 2021-07-19 NOTE — HISTORY OF PRESENT ILLNESS
[Diabetes Mellitus] : Diabetes Mellitus [Hyperlipidemia] : Hyperlipidemia [Hypertension] : Hypertension [Other: ___] : [unfilled] [FreeTextEntry6] : pt had prostate bio was benign had several episodes of acute  retention  NO  seizures  since 2005.  Pt c/o  spring has  taken inhaler and claritin using  proair had  similar sx last  yr and saw  cardiology and started on proair has multiple environmental allergies no cp has l stress test in FEB that was neg no slight nasal congestion using c-pap [Check glucose ___ x/day] : Patient checks  blood glucose [unfilled]  times a day [Most Recent A1C: ___] : Most recent A1C was [unfilled] [Checks BP Regularly] : The patient checks ~his/her~ blood pressure regularly [<140/90] : Target blood pressure is <140/90 [Target goal met] : BP target goal met [Doing Well] : Patient is doing well [Low Intensity Therapy] : Patient is currently on low intensity statin  therapy

## 2021-07-19 NOTE — ASSESSMENT
[FreeTextEntry1] : cough and spring  start   advair bp and hld at goal copd  start breo rtc 1 mo decrease metoprolol to 100 mg daily

## 2021-07-19 NOTE — PHYSICAL EXAM
[No Acute Distress] : no acute distress [PERRL] : pupils equal round and reactive to light [Normal TMs] : both tympanic membranes were normal [Supple] : supple [Clear to Auscultation] : lungs were clear to auscultation bilaterally [Regular Rhythm] : with a regular rhythm [No Edema] : there was no peripheral edema [Normal Supraclavicular Nodes] : no supraclavicular lymphadenopathy [Normal Posterior Cervical Nodes] : no posterior cervical lymphadenopathy [Normal Anterior Cervical Nodes] : no anterior cervical lymphadenopathy [No CVA Tenderness] : no CVA  tenderness [No Joint Swelling] : no joint swelling [No Rash] : no rash [No Focal Deficits] : no focal deficits [Normal Affect] : the affect was normal

## 2021-08-11 ENCOUNTER — TRANSCRIPTION ENCOUNTER (OUTPATIENT)
Age: 76
End: 2021-08-11

## 2021-08-23 ENCOUNTER — APPOINTMENT (OUTPATIENT)
Dept: FAMILY MEDICINE | Facility: CLINIC | Age: 76
End: 2021-08-23
Payer: MEDICARE

## 2021-08-23 VITALS
BODY MASS INDEX: 29.66 KG/M2 | OXYGEN SATURATION: 95 % | DIASTOLIC BLOOD PRESSURE: 70 MMHG | SYSTOLIC BLOOD PRESSURE: 140 MMHG | HEART RATE: 68 BPM | WEIGHT: 219 LBS | TEMPERATURE: 97.6 F | RESPIRATION RATE: 16 BRPM | HEIGHT: 72 IN

## 2021-08-23 VITALS — SYSTOLIC BLOOD PRESSURE: 130 MMHG | DIASTOLIC BLOOD PRESSURE: 80 MMHG | RESPIRATION RATE: 16 BRPM | HEART RATE: 72 BPM

## 2021-08-23 PROCEDURE — 99214 OFFICE O/P EST MOD 30 MIN: CPT

## 2021-08-23 NOTE — PHYSICAL EXAM
[No Acute Distress] : no acute distress [PERRL] : pupils equal round and reactive to light [Normal TMs] : both tympanic membranes were normal [Supple] : supple [Clear to Auscultation] : lungs were clear to auscultation bilaterally [Regular Rhythm] : with a regular rhythm [No Edema] : there was no peripheral edema [Normal] : soft, non-tender, non-distended, no masses palpated, no HSM and normal bowel sounds [Normal Supraclavicular Nodes] : no supraclavicular lymphadenopathy [Normal Posterior Cervical Nodes] : no posterior cervical lymphadenopathy [Normal Anterior Cervical Nodes] : no anterior cervical lymphadenopathy [No CVA Tenderness] : no CVA  tenderness [No Joint Swelling] : no joint swelling [No Rash] : no rash [Normal Gait] : normal gait [Normal Affect] : the affect was normal

## 2021-08-23 NOTE — HISTORY OF PRESENT ILLNESS
[Diabetes Mellitus] : Diabetes Mellitus [Hyperlipidemia] : Hyperlipidemia [Hypertension] : Hypertension [Other: ___] : [unfilled] [Check glucose ___ x/day] : Patient checks  blood glucose [unfilled]  times a day [Most Recent A1C: ___] : Most recent A1C was [unfilled] [Checks BP Regularly] : The patient checks ~his/her~ blood pressure regularly [<140/90] : Target blood pressure is <140/90 [Target goal met] : BP target goal met [Doing Well] : Patient is doing well [Low Intensity Therapy] : Patient is currently on low intensity statin  therapy [FreeTextEntry6] : using  c-pap has feeling as if he is not getting enough air uses  rescue  several times a wks no seizures

## 2021-08-23 NOTE — ASSESSMENT
[FreeTextEntry1] : sleep medicine referral has not had reevaluation in 10 yrs lab evaluation\par medically stable  continue all meds\par

## 2021-08-24 LAB
ALBUMIN SERPL ELPH-MCNC: 4.5 G/DL
ALP BLD-CCNC: 70 U/L
ALT SERPL-CCNC: 14 U/L
ANION GAP SERPL CALC-SCNC: 12 MMOL/L
APPEARANCE: CLEAR
AST SERPL-CCNC: 16 U/L
BACTERIA: NEGATIVE
BASOPHILS # BLD AUTO: 0.03 K/UL
BASOPHILS NFR BLD AUTO: 0.3 %
BILIRUB SERPL-MCNC: 0.5 MG/DL
BILIRUBIN URINE: NEGATIVE
BLOOD URINE: NORMAL
BUN SERPL-MCNC: 28 MG/DL
CALCIUM SERPL-MCNC: 9.7 MG/DL
CHLORIDE SERPL-SCNC: 102 MMOL/L
CHOLEST SERPL-MCNC: 177 MG/DL
CO2 SERPL-SCNC: 28 MMOL/L
COLOR: NORMAL
CREAT SERPL-MCNC: 1.49 MG/DL
CREAT SPEC-SCNC: 110 MG/DL
EOSINOPHIL # BLD AUTO: 0.27 K/UL
EOSINOPHIL NFR BLD AUTO: 2.9 %
ESTIMATED AVERAGE GLUCOSE: 137 MG/DL
GLUCOSE QUALITATIVE U: NEGATIVE
GLUCOSE SERPL-MCNC: 142 MG/DL
HBA1C MFR BLD HPLC: 6.4 %
HCT VFR BLD CALC: 45.4 %
HDLC SERPL-MCNC: 36 MG/DL
HGB BLD-MCNC: 14.3 G/DL
HYALINE CASTS: 1 /LPF
IMM GRANULOCYTES NFR BLD AUTO: 0.3 %
KETONES URINE: NEGATIVE
LDLC SERPL CALC-MCNC: 63 MG/DL
LEUKOCYTE ESTERASE URINE: NEGATIVE
LYMPHOCYTES # BLD AUTO: 1.7 K/UL
LYMPHOCYTES NFR BLD AUTO: 18.2 %
MAN DIFF?: NORMAL
MCHC RBC-ENTMCNC: 30.6 PG
MCHC RBC-ENTMCNC: 31.5 GM/DL
MCV RBC AUTO: 97.2 FL
MICROALBUMIN 24H UR DL<=1MG/L-MCNC: 1.4 MG/DL
MICROALBUMIN/CREAT 24H UR-RTO: 13 MG/G
MICROSCOPIC-UA: NORMAL
MONOCYTES # BLD AUTO: 0.47 K/UL
MONOCYTES NFR BLD AUTO: 5 %
NEUTROPHILS # BLD AUTO: 6.84 K/UL
NEUTROPHILS NFR BLD AUTO: 73.3 %
NITRITE URINE: NEGATIVE
NONHDLC SERPL-MCNC: 141 MG/DL
PH URINE: 5.5
PLATELET # BLD AUTO: 229 K/UL
POTASSIUM SERPL-SCNC: 4.3 MMOL/L
PROT SERPL-MCNC: 7.2 G/DL
PROTEIN URINE: NEGATIVE
RBC # BLD: 4.67 M/UL
RBC # FLD: 13.7 %
RED BLOOD CELLS URINE: 3 /HPF
SODIUM SERPL-SCNC: 142 MMOL/L
SPECIFIC GRAVITY URINE: 1.02
SQUAMOUS EPITHELIAL CELLS: 2 /HPF
T4 SERPL-MCNC: 5.8 UG/DL
TRIGL SERPL-MCNC: 392 MG/DL
TSH SERPL-ACNC: 1.39 UIU/ML
URATE SERPL-MCNC: 8 MG/DL
UROBILINOGEN URINE: NORMAL
WBC # FLD AUTO: 9.34 K/UL
WHITE BLOOD CELLS URINE: 3 /HPF

## 2021-08-25 LAB — LAMOTRIGINE SERPL-MCNC: 4.7 UG/ML

## 2021-10-01 ENCOUNTER — APPOINTMENT (OUTPATIENT)
Dept: PULMONOLOGY | Facility: CLINIC | Age: 76
End: 2021-10-01
Payer: MEDICARE

## 2021-10-01 VITALS
RESPIRATION RATE: 16 BRPM | BODY MASS INDEX: 29.16 KG/M2 | WEIGHT: 215 LBS | SYSTOLIC BLOOD PRESSURE: 130 MMHG | HEART RATE: 62 BPM | OXYGEN SATURATION: 97 % | DIASTOLIC BLOOD PRESSURE: 80 MMHG

## 2021-10-01 PROCEDURE — 99203 OFFICE O/P NEW LOW 30 MIN: CPT

## 2021-10-01 RX ORDER — LANCETS
EACH MISCELLANEOUS
Qty: 1 | Refills: 3 | Status: COMPLETED | COMMUNITY
Start: 2019-04-09 | End: 2021-10-01

## 2021-10-08 ENCOUNTER — OUTPATIENT (OUTPATIENT)
Dept: OUTPATIENT SERVICES | Facility: HOSPITAL | Age: 76
LOS: 1 days | End: 2021-10-08
Payer: MEDICARE

## 2021-10-08 ENCOUNTER — APPOINTMENT (OUTPATIENT)
Age: 76
End: 2021-10-08
Payer: MEDICARE

## 2021-10-08 DIAGNOSIS — G47.33 OBSTRUCTIVE SLEEP APNEA (ADULT) (PEDIATRIC): ICD-10-CM

## 2021-10-08 PROCEDURE — G0400: CPT

## 2021-10-08 PROCEDURE — ZZZZZ: CPT

## 2021-10-24 ENCOUNTER — FORM ENCOUNTER (OUTPATIENT)
Age: 76
End: 2021-10-24

## 2021-10-25 ENCOUNTER — OUTPATIENT (OUTPATIENT)
Dept: OUTPATIENT SERVICES | Facility: HOSPITAL | Age: 76
LOS: 1 days | End: 2021-10-25
Payer: MEDICARE

## 2021-10-25 ENCOUNTER — APPOINTMENT (OUTPATIENT)
Age: 76
End: 2021-10-25
Payer: MEDICARE

## 2021-10-25 DIAGNOSIS — G47.33 OBSTRUCTIVE SLEEP APNEA (ADULT) (PEDIATRIC): ICD-10-CM

## 2021-10-25 PROCEDURE — G0400: CPT

## 2021-10-25 PROCEDURE — G0400: CPT | Mod: 26

## 2021-10-26 ENCOUNTER — TRANSCRIPTION ENCOUNTER (OUTPATIENT)
Age: 76
End: 2021-10-26

## 2021-10-30 NOTE — HISTORY OF PRESENT ILLNESS
[Obstructive Sleep Apnea] : obstructive sleep apnea [Awakes Unrefreshed] : awakes unrefreshed [Daytime Somnolence] : daytime somnolence [Snoring] : snoring [TextBox_4] : Overweight, hypertensive male on CPAP for SEVEN for at least 10 years seen for sleep evaluation and management \par Originally diagnosed in the 1990's at [facility]\par Last seen by Dr Mota about 2014\par Has a Resmed S10 set at 13 cm\par May need a slightly higher pressure\par Uses nasal pillows\par [employer] is his DME\par Due to shoulder pain spending more time sleeping supine than usual [ESS] : 8

## 2021-10-30 NOTE — PHYSICAL EXAM
[No Acute Distress] : no acute distress [Elongated Uvula] : elongated uvula [Enlarged Base of the Tongue] : enlarged base of the tongue [II] : Mallampati Class: II [Normal Appearance] : normal appearance [Neck Circumference: ___] : neck circumference: [unfilled] [No Neck Mass] : no neck mass [Normal Rate/Rhythm] : normal rate/rhythm [No Resp Distress] : no resp distress [Clear to Auscultation Bilaterally] : clear to auscultation bilaterally [No Clubbing] : no clubbing [No Cyanosis] : no cyanosis [No Edema] : no edema [No Focal Deficits] : no focal deficits [Oriented x3] : oriented x3 [Normal Affect] : normal affect [TextBox_2] : overweight

## 2021-11-01 ENCOUNTER — APPOINTMENT (OUTPATIENT)
Dept: PULMONOLOGY | Facility: CLINIC | Age: 76
End: 2021-11-01
Payer: MEDICARE

## 2021-11-01 VITALS
SYSTOLIC BLOOD PRESSURE: 144 MMHG | DIASTOLIC BLOOD PRESSURE: 96 MMHG | BODY MASS INDEX: 29.8 KG/M2 | HEIGHT: 72 IN | OXYGEN SATURATION: 96 % | RESPIRATION RATE: 16 BRPM | WEIGHT: 220 LBS | HEART RATE: 68 BPM

## 2021-11-01 PROCEDURE — 99213 OFFICE O/P EST LOW 20 MIN: CPT

## 2021-11-01 RX ORDER — TRAZODONE HYDROCHLORIDE 50 MG/1
50 TABLET ORAL
Qty: 90 | Refills: 1 | Status: DISCONTINUED | COMMUNITY
Start: 2018-12-17 | End: 2021-11-01

## 2021-11-09 ENCOUNTER — APPOINTMENT (OUTPATIENT)
Dept: DISASTER EMERGENCY | Facility: CLINIC | Age: 76
End: 2021-11-09

## 2021-11-10 ENCOUNTER — FORM ENCOUNTER (OUTPATIENT)
Age: 76
End: 2021-11-10

## 2021-11-10 LAB — SARS-COV-2 N GENE NPH QL NAA+PROBE: NOT DETECTED

## 2021-11-11 ENCOUNTER — APPOINTMENT (OUTPATIENT)
Age: 76
End: 2021-11-11
Payer: MEDICARE

## 2021-11-11 ENCOUNTER — OUTPATIENT (OUTPATIENT)
Dept: OUTPATIENT SERVICES | Facility: HOSPITAL | Age: 76
LOS: 1 days | End: 2021-11-11
Payer: MEDICARE

## 2021-11-11 DIAGNOSIS — G47.33 OBSTRUCTIVE SLEEP APNEA (ADULT) (PEDIATRIC): ICD-10-CM

## 2021-11-11 PROCEDURE — 95811 POLYSOM 6/>YRS CPAP 4/> PARM: CPT | Mod: 26

## 2021-11-11 PROCEDURE — 95811 POLYSOM 6/>YRS CPAP 4/> PARM: CPT

## 2021-11-20 ENCOUNTER — TRANSCRIPTION ENCOUNTER (OUTPATIENT)
Age: 76
End: 2021-11-20

## 2021-11-20 ENCOUNTER — EMERGENCY (EMERGENCY)
Facility: HOSPITAL | Age: 76
LOS: 0 days | Discharge: HOME | End: 2021-11-20
Attending: EMERGENCY MEDICINE
Payer: MEDICARE

## 2021-11-20 VITALS
TEMPERATURE: 98 F | DIASTOLIC BLOOD PRESSURE: 87 MMHG | OXYGEN SATURATION: 100 % | RESPIRATION RATE: 17 BRPM | HEART RATE: 65 BPM | SYSTOLIC BLOOD PRESSURE: 154 MMHG

## 2021-11-20 VITALS
RESPIRATION RATE: 16 BRPM | SYSTOLIC BLOOD PRESSURE: 150 MMHG | OXYGEN SATURATION: 100 % | HEART RATE: 68 BPM | DIASTOLIC BLOOD PRESSURE: 80 MMHG | TEMPERATURE: 98 F

## 2021-11-20 DIAGNOSIS — E11.9 TYPE 2 DIABETES MELLITUS WITHOUT COMPLICATIONS: ICD-10-CM

## 2021-11-20 DIAGNOSIS — M54.6 PAIN IN THORACIC SPINE: ICD-10-CM

## 2021-11-20 DIAGNOSIS — S29.011A STRAIN OF MUSCLE AND TENDON OF FRONT WALL OF THORAX, INITIAL ENCOUNTER: ICD-10-CM

## 2021-11-20 DIAGNOSIS — R07.89 OTHER CHEST PAIN: ICD-10-CM
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
## 2021-11-20 DIAGNOSIS — J20.9 ACUTE BRONCHITIS, UNSPECIFIED: ICD-10-CM

## 2021-11-20 DIAGNOSIS — I10 ESSENTIAL (PRIMARY) HYPERTENSION: ICD-10-CM
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
## 2021-11-20 DIAGNOSIS — Z20.822 CONTACT WITH AND (SUSPECTED) EXPOSURE TO COVID-19: ICD-10-CM

## 2021-11-20 DIAGNOSIS — Z79.84 LONG TERM (CURRENT) USE OF ORAL HYPOGLYCEMIC DRUGS: ICD-10-CM

## 2021-11-20 DIAGNOSIS — E78.5 HYPERLIPIDEMIA, UNSPECIFIED: ICD-10-CM

## 2021-11-20 DIAGNOSIS — R05.9 COUGH, UNSPECIFIED: ICD-10-CM

## 2021-11-20 LAB
ALBUMIN SERPL ELPH-MCNC: 3.8 G/DL — SIGNIFICANT CHANGE UP (ref 3.3–5)
ALP SERPL-CCNC: 63 U/L — SIGNIFICANT CHANGE UP (ref 40–120)
ALT FLD-CCNC: 23 U/L — SIGNIFICANT CHANGE UP (ref 12–78)
ANION GAP SERPL CALC-SCNC: 2 MMOL/L — LOW (ref 5–17)
APPEARANCE UR: CLEAR — SIGNIFICANT CHANGE UP
APTT BLD: 30.7 SEC — SIGNIFICANT CHANGE UP (ref 27.5–35.5)
AST SERPL-CCNC: 18 U/L — SIGNIFICANT CHANGE UP (ref 15–37)
BASOPHILS # BLD AUTO: 0.03 K/UL — SIGNIFICANT CHANGE UP (ref 0–0.2)
BASOPHILS NFR BLD AUTO: 0.4 % — SIGNIFICANT CHANGE UP (ref 0–2)
BILIRUB SERPL-MCNC: 0.7 MG/DL — SIGNIFICANT CHANGE UP (ref 0.2–1.2)
BILIRUB UR-MCNC: NEGATIVE — SIGNIFICANT CHANGE UP
BUN SERPL-MCNC: 18 MG/DL — SIGNIFICANT CHANGE UP (ref 7–23)
CALCIUM SERPL-MCNC: 8.9 MG/DL — SIGNIFICANT CHANGE UP (ref 8.5–10.1)
CHLORIDE SERPL-SCNC: 107 MMOL/L — SIGNIFICANT CHANGE UP (ref 96–108)
CO2 SERPL-SCNC: 31 MMOL/L — SIGNIFICANT CHANGE UP (ref 22–31)
COLOR SPEC: YELLOW — SIGNIFICANT CHANGE UP
CREAT SERPL-MCNC: 1.41 MG/DL — HIGH (ref 0.5–1.3)
D DIMER BLD IA.RAPID-MCNC: <150 NG/ML DDU — SIGNIFICANT CHANGE UP
DIFF PNL FLD: NEGATIVE — SIGNIFICANT CHANGE UP
EOSINOPHIL # BLD AUTO: 0.23 K/UL — SIGNIFICANT CHANGE UP (ref 0–0.5)
EOSINOPHIL NFR BLD AUTO: 2.9 % — SIGNIFICANT CHANGE UP (ref 0–6)
GLUCOSE SERPL-MCNC: 118 MG/DL — HIGH (ref 70–99)
GLUCOSE UR QL: NEGATIVE MG/DL — SIGNIFICANT CHANGE UP
HCT VFR BLD CALC: 44.8 % — SIGNIFICANT CHANGE UP (ref 39–50)
HGB BLD-MCNC: 14 G/DL — SIGNIFICANT CHANGE UP (ref 13–17)
IMM GRANULOCYTES NFR BLD AUTO: 0.4 % — SIGNIFICANT CHANGE UP (ref 0–1.5)
INR BLD: 1.03 RATIO — SIGNIFICANT CHANGE UP (ref 0.88–1.16)
KETONES UR-MCNC: NEGATIVE — SIGNIFICANT CHANGE UP
LEUKOCYTE ESTERASE UR-ACNC: ABNORMAL
LIDOCAIN IGE QN: 99 U/L — SIGNIFICANT CHANGE UP (ref 73–393)
LYMPHOCYTES # BLD AUTO: 1.51 K/UL — SIGNIFICANT CHANGE UP (ref 1–3.3)
LYMPHOCYTES # BLD AUTO: 18.9 % — SIGNIFICANT CHANGE UP (ref 13–44)
MCHC RBC-ENTMCNC: 29.7 PG — SIGNIFICANT CHANGE UP (ref 27–34)
MCHC RBC-ENTMCNC: 31.3 GM/DL — LOW (ref 32–36)
MCV RBC AUTO: 94.9 FL — SIGNIFICANT CHANGE UP (ref 80–100)
MONOCYTES # BLD AUTO: 0.46 K/UL — SIGNIFICANT CHANGE UP (ref 0–0.9)
MONOCYTES NFR BLD AUTO: 5.8 % — SIGNIFICANT CHANGE UP (ref 2–14)
NEUTROPHILS # BLD AUTO: 5.72 K/UL — SIGNIFICANT CHANGE UP (ref 1.8–7.4)
NEUTROPHILS NFR BLD AUTO: 71.6 % — SIGNIFICANT CHANGE UP (ref 43–77)
NITRITE UR-MCNC: NEGATIVE — SIGNIFICANT CHANGE UP
PH UR: 6 — SIGNIFICANT CHANGE UP (ref 5–8)
PLATELET # BLD AUTO: 255 K/UL — SIGNIFICANT CHANGE UP (ref 150–400)
POTASSIUM SERPL-MCNC: 4.8 MMOL/L — SIGNIFICANT CHANGE UP (ref 3.5–5.3)
POTASSIUM SERPL-SCNC: 4.8 MMOL/L — SIGNIFICANT CHANGE UP (ref 3.5–5.3)
PROT SERPL-MCNC: 7.5 GM/DL — SIGNIFICANT CHANGE UP (ref 6–8.3)
PROT UR-MCNC: NEGATIVE MG/DL — SIGNIFICANT CHANGE UP
PROTHROM AB SERPL-ACNC: 11.9 SEC — SIGNIFICANT CHANGE UP (ref 10.6–13.6)
RBC # BLD: 4.72 M/UL — SIGNIFICANT CHANGE UP (ref 4.2–5.8)
RBC # FLD: 14.7 % — HIGH (ref 10.3–14.5)
SODIUM SERPL-SCNC: 140 MMOL/L — SIGNIFICANT CHANGE UP (ref 135–145)
SP GR SPEC: 1.01 — SIGNIFICANT CHANGE UP (ref 1.01–1.02)
TROPONIN I, HIGH SENSITIVITY RESULT: 29.68 NG/L — SIGNIFICANT CHANGE UP
UROBILINOGEN FLD QL: NEGATIVE MG/DL — SIGNIFICANT CHANGE UP
WBC # BLD: 7.98 K/UL — SIGNIFICANT CHANGE UP (ref 3.8–10.5)
WBC # FLD AUTO: 7.98 K/UL — SIGNIFICANT CHANGE UP (ref 3.8–10.5)

## 2021-11-20 PROCEDURE — 71045 X-RAY EXAM CHEST 1 VIEW: CPT | Mod: 26

## 2021-11-20 PROCEDURE — 85730 THROMBOPLASTIN TIME PARTIAL: CPT

## 2021-11-20 PROCEDURE — 83690 ASSAY OF LIPASE: CPT

## 2021-11-20 PROCEDURE — 85379 FIBRIN DEGRADATION QUANT: CPT

## 2021-11-20 PROCEDURE — 99284 EMERGENCY DEPT VISIT MOD MDM: CPT | Mod: 25

## 2021-11-20 PROCEDURE — 84484 ASSAY OF TROPONIN QUANT: CPT

## 2021-11-20 PROCEDURE — 85025 COMPLETE CBC W/AUTO DIFF WBC: CPT

## 2021-11-20 PROCEDURE — 93010 ELECTROCARDIOGRAM REPORT: CPT

## 2021-11-20 PROCEDURE — 85610 PROTHROMBIN TIME: CPT

## 2021-11-20 PROCEDURE — 71045 X-RAY EXAM CHEST 1 VIEW: CPT

## 2021-11-20 PROCEDURE — 36415 COLL VENOUS BLD VENIPUNCTURE: CPT

## 2021-11-20 PROCEDURE — 93005 ELECTROCARDIOGRAM TRACING: CPT

## 2021-11-20 PROCEDURE — 99285 EMERGENCY DEPT VISIT HI MDM: CPT

## 2021-11-20 PROCEDURE — 80053 COMPREHEN METABOLIC PANEL: CPT

## 2021-11-20 PROCEDURE — 81001 URINALYSIS AUTO W/SCOPE: CPT

## 2021-11-20 RX ORDER — AZITHROMYCIN 500 MG/1
500 TABLET, FILM COATED ORAL ONCE
Refills: 0 | Status: COMPLETED | OUTPATIENT
Start: 2021-11-20 | End: 2021-11-20

## 2021-11-20 RX ORDER — AZITHROMYCIN 500 MG/1
1 TABLET, FILM COATED ORAL
Qty: 3 | Refills: 0
Start: 2021-11-20 | End: 2021-11-22

## 2021-11-20 RX ORDER — SODIUM CHLORIDE 9 MG/ML
500 INJECTION INTRAMUSCULAR; INTRAVENOUS; SUBCUTANEOUS ONCE
Refills: 0 | Status: COMPLETED | OUTPATIENT
Start: 2021-11-20 | End: 2021-11-20

## 2021-11-20 RX ADMIN — SODIUM CHLORIDE 500 MILLILITER(S): 9 INJECTION INTRAMUSCULAR; INTRAVENOUS; SUBCUTANEOUS at 17:21

## 2021-11-20 RX ADMIN — AZITHROMYCIN 500 MILLIGRAM(S): 500 TABLET, FILM COATED ORAL at 19:34

## 2021-11-20 NOTE — ED PROVIDER NOTE - NSICDXPASTMEDICALHX_GEN_ALL_CORE_FT
PAST MEDICAL HISTORY:  Nephrolithiasis       Anxiety     BPH (benign prostatic hyperplasia)     COPD, mild     HTN (hypertension)     SEVEN on CPAP     Overweight

## 2021-11-20 NOTE — ED PROVIDER NOTE - GASTROINTESTINAL, MLM
Abdomen soft, non-tender, no guarding. Bowel sounds positive, normal pitch. +ventral hernia, soft, non-tender, easily reduced manually Abdomen soft, non-tender. Bowel sounds positive, normal pitch. +ventral hernia, soft, non-tender, easily reduced manually.

## 2021-11-20 NOTE — ED PROVIDER NOTE - CLINICAL SUMMARY MEDICAL DECISION MAKING FREE TEXT BOX
77 y/o male with PMHx of overweight, HTN, HLD, DM type 2 on metformin, mild COPD, SEVEN on CPAP, BPH, mitral valve prolapse, anxiety, depression, kidney stones, pancreas cyst, diverticulosis and hx syncope, cholecystomy ambulatory from  for 3 days of pain right posterior thoracic, radiating to anterior right chest. Also positive +cough with greenish sputum. No fever, no chills, no urine complaints. Pt referred by  for r/o PE. Wells score equals 3. Plan EKG, chest x-ray, labs including d-dimer, lipase, troponin, coags, urine, cautious IV fluid. Monitor, observe, reassess. 77 y/o male with PMHx of overweight, HTN, HLD, DM type 2 on metformin, mild COPD, SEVEN on CPAP, BPH, mitral valve prolapse, anxiety, depression, kidney stones, pancreas cyst, diverticulosis, syncope, cholecystomy presents to the ED ambulatory from  for 3 days of pain right posterior thoracic, radiating to anterior right chest. Also positive +cough with greenish sputum. No fever, no chills, no urine complaints. Pt referred by  for r/o PE. Wells score equals 3. Plan EKG, chest x-ray, labs including d-dimer, lipase, troponin, coags, urine, cautious IV fluid. Monitor, observe, reassess. 77 y/o male with PMHx of overweight, HTN, HLD, DM type 2 on metformin, mild COPD, SEVEN on CPAP, BPH, mitral valve prolapse, anxiety, depression, kidney stones, pancreas cyst, diverticulosis, syncope, cholecystomy presents to the ED ambulatory from  for 3 days of pain right posterior thoracic, radiating to anterior right chest. Also positive +cough with greenish sputum. No fever, no chills, no urine complaints. Pt referred by  for r/o PE. Wells score equals 3.   Plan: EKG, chest x-ray, labs including d-dimer, lipase, troponin, coags, urine, cautious IV fluid. Monitor, observe, reassess.

## 2021-11-20 NOTE — ED ADULT NURSE NOTE - OBJECTIVE STATEMENT
Patient having chest pain and back pain for the past 2 days.  Went to urgent care and was sent straight to ED.

## 2021-11-20 NOTE — ED PROVIDER NOTE - PROGRESS NOTE DETAILS
SHIRLEY Galindo MD:  Pt informed of labs, EKG, CXR, U/A results; + agreeable with D/C home on po Abx. SHIRLEY Galindo MD:  Pt informed of labs, EKG, CXR, U/A results; + agreeable with D/C home on po Abx for suspected acute bronchitis.

## 2021-11-20 NOTE — ED PROVIDER NOTE - MUSCULOSKELETAL, MLM
Spine appears normal, range of motion is not limited, no muscle or joint tenderness +Pt localizes pain to right lower posterior thoracic area, not below rib cage but no focal tenderness there.

## 2021-11-20 NOTE — ED PROVIDER NOTE - CONSTITUTIONAL, MLM
normal... Elderly white male, non toxic appearing. In no respiratory discomfort. Elderly white male, well-appearing. In no respiratory discomfort.

## 2021-11-20 NOTE — ED PROVIDER NOTE - CHPI ED SYMPTOMS NEG
no loss of appetite, no swelling in arms or legs, no urinary problems, no abd pan/no fever/no nausea/no shortness of breath/no vomiting no loss of appetite, no swelling in arms or legs, no urinary problems, no abd pain/no fever/no nausea/no shortness of breath/no vomiting

## 2021-11-20 NOTE — ED PROVIDER NOTE - NSFOLLOWUPINSTRUCTIONS_ED_ALL_ED_FT
Take antibiotic as prescribed.  Avoid heavy lifting, pulling/pushing, manual exertion.  Continue your regular medications as per routine.  Follow up this upcoming week with your own doctor.  Return to ED if symptoms worsen.      Acute Bronchitis    WHAT YOU NEED TO KNOW:    Acute bronchitis is swelling and irritation in your lungs. It is usually caused by a virus and most often happens in the winter. Bronchitis may also be caused by bacteria or by a chemical irritant, such as smoke.    DISCHARGE INSTRUCTIONS:    Return to the emergency department if:   •You cough up blood.      •Your lips or fingernails turn blue.      •You feel like you are not getting enough air when you breathe.      Call your doctor if:   •Your symptoms do not go away or get worse, even after treatment.      •Your cough does not get better within 4 weeks.      •You have questions or concerns about your condition or care.      Medicines: You may need any of the following:   •Cough suppressants decrease your urge to cough.       •Decongestants help loosen mucus in your lungs and make it easier to cough up. This can help you breathe easier.      •Inhalers may be given. Your healthcare provider may give you one or more inhalers to help you breathe easier and cough less. An inhaler gives your medicine to open your airways. Ask your healthcare provider to show you how to use your inhaler correctly.  Metered Dose Inhaler           •Antibiotics may be given for up to 5 days if your bronchitis is caused by bacteria.      •Acetaminophen decreases pain and fever. It is available without a doctor's order. Ask how much to take and how often to take it. Follow directions. Read the labels of all other medicines you are using to see if they also contain acetaminophen, or ask your doctor or pharmacist. Acetaminophen can cause liver damage if not taken correctly. Do not use more than 4 grams (4,000 milligrams) total of acetaminophen in one day.       •NSAIDs help decrease swelling and pain or fever. This medicine is available with or without a doctor's order. NSAIDs can cause stomach bleeding or kidney problems in certain people. If you take blood thinner medicine, always ask your healthcare provider if NSAIDs are safe for you. Always read the medicine label and follow directions.      •Take your medicine as directed. Contact your healthcare provider if you think your medicine is not helping or if you have side effects. Tell him of her if you are allergic to any medicine. Keep a list of the medicines, vitamins, and herbs you take. Include the amounts, and when and why you take them. Bring the list or the pill bottles to follow-up visits. Carry your medicine list with you in case of an emergency.      Self-care:   •Drink liquids as directed. You may need to drink more liquids than usual to stay hydrated. Ask how much liquid to drink each day and which liquids are best for you.      •Use a cool mist humidifier to increase air moisture in your home. This may make it easier for you to breathe and help decrease your cough.       •Get more rest. Rest helps your body to heal. Slowly start to do more each day. Rest when you feel it is needed.      •Avoid irritants in the air. Avoid chemicals, fumes, and dust. Wear a face mask if you must work around dust or fumes. Stay inside on days when air pollution levels are high. If you have allergies, stay inside when pollen counts are high. Do not use aerosol products, such as spray-on deodorant, bug spray, and hair spray.      •Do not smoke or be around others who are smoking. Nicotine and other chemicals in cigarettes and cigars can cause lung damage. Ask your healthcare provider for information if you currently smoke and need help to quit. E-cigarettes or smokeless tobacco still contain nicotine. Talk to your healthcare provider before you use these products.       Prevent acute bronchitis:          •Ask about vaccines you may need. Get a flu vaccine each year as soon as recommended, usually in September or October. Ask your healthcare provider if you should also get a pneumonia or COVID-19 vaccine. Your healthcare provider can tell you if you should also get other vaccines, and when to get them.      •Prevent the spread of germs. You can decrease your risk for acute bronchitis and other illnesses by doing the following: ?Wash your hands often with soap and water. Carry germ-killing hand lotion or gel with you. You can use the lotion or gel to clean your hands when soap and water are not available.  Handwashing           ?Do not touch your eyes, nose, or mouth unless you have washed your hands first.      ?Always cover your mouth when you cough to prevent the spread of germs. It is best to cough into a tissue or your shirt sleeve instead of into your hand. Ask those around you to cover their mouths when they cough.      ?Try to avoid people who have a cold or the flu. If you are sick, stay away from others as much as possible.        Follow up with your doctor as directed: Write down questions you have so you will remember to ask them during your follow-up visits.

## 2021-11-20 NOTE — ED PROVIDER NOTE - CARE PLAN
Principal Discharge DX:	Acute bronchitis  Secondary Diagnosis:	Chest wall muscle strain, initial encounter   1

## 2021-11-20 NOTE — ED PROVIDER NOTE - OBJECTIVE STATEMENT
75 y/o male ambulatory to ED from  referral c/o CP and back pain. Pt sent for r/o PE. PMHx includes overweight, HTN, HLD, DM type 2 on metformin, mild COPD, SEVEN on CPAP, BPH, mitral valve prolapse, anxiety, depression, kidney stones, pancreas cyst, diverticulosis and hx syncope, cholecystomy. On Wednesday pt woke up with dull pain under shoulder blade in the middle of back. Thought it was the start of kidney stone. In the past 24 hours, pain started to radiate to chest. Rates pain as mild, 2/10, constant and waxing and waning. Pain does not go into groin area. No sob, no abd pain, no n/v, no fever, no loss of appetite, no swelling in arms or legs, no urinary problems, no recent immobility extended travel in the past three months. Pt also has coughed up green sputum in the past two days. NKDA. PCP is Dr. Diamond. Pt is COVID vaccinated. 77 y/o male ambulatory to ED from  referral c/o CP and back pain. Pt sent for r/o PE. PMHx includes overweight, HTN, HLD, DM type 2 on metformin, mild COPD, SEVEN on CPAP, BPH, mitral valve prolapse, anxiety, depression, kidney stones, pancreas cyst, diverticulosis, syncope, cholecystomy. On Wednesday pt woke up with dull pain under shoulder blade in the middle of back. Thought it was the start of kidney stone. In the past 24 hours, pain started to radiate to chest. Rates pain as mild, 2/10, constant and waxing and waning. Pain does not go into groin area. No sob, no abd pain, no n/v, no fever, no loss of appetite, no swelling in arms or legs, no urinary problems, no recent immobility extended travel in the past three months. Pt also has coughed up green sputum in the past two days. NKDA. PCP is Dr. Diamond. Pt is COVID vaccinated. 77 y/o male ambulatory to ED from  referral c/o CP and back pain X 3 dd. Pt sent for r/o PE. PMHx includes overweight, HTN, HLD, DM type 2 on metformin, mild COPD, SEVEN on CPAP, BPH, mitral valve prolapse, anxiety, depression, kidney stones, pancreas cyst, diverticulosis, syncope, cholecystomy. On Wednesday, 11/17, pt woke up with dull pain under shoulder blade in the middle of back. Thought it was the start of kidney stone. In the past 24 hours, pain started to radiate to chest. Rates pain as mild, 2/10, constant and waxing and waning. Pain does not go into groin area. No sob, no abd pain, no n/v, no fever, no loss of appetite, no swelling in arms or legs, no urinary problems, no recent immobility extended travel in the past three months. Pt also has coughed up green sputum in the past two days. NKDA. PCP is Dr. Diamond. Pt is COVID vaccinated.

## 2021-11-20 NOTE — ED PROVIDER NOTE - PATIENT PORTAL LINK FT
You can access the FollowMyHealth Patient Portal offered by [facility] by registering at the following website: [email or web address]. By joining [facility]’s FollowMyHealth portal, you will also be able to view your health information using other applications (apps) compatible with our system.

## 2021-11-24 NOTE — ED ADULT NURSE NOTE - NSFALLRSKASSESSDT_ED_ALL_ED
Non-Graft Cartilage Fenestration Text: The cartilage was fenestrated with a 2mm punch biopsy to help facilitate healing. 20-Nov-2021 16:16

## 2021-12-01 NOTE — HISTORY OF PRESENT ILLNESS
[Obstructive Sleep Apnea] : obstructive sleep apnea [Awakes Unrefreshed] : awakes unrefreshed [Daytime Somnolence] : daytime somnolence [Snoring] : snoring [TextBox_4] : Overweight, hypertensive male on CPAP for SEVEN for at least 10 years seen for sleep evaluation and management \par Originally diagnosed in the 1990's at [place]\par Last seen by Dr Mota about 2014\par Has a Resmed S10 set at 13 cm\par May need a slightly higher pressure\par Uses nasal pillows\par [employer] is his DME\par Due to shoulder pain spending more time sleeping supine than usual [ESS] : 8

## 2021-12-01 NOTE — ASSESSMENT
[FreeTextEntry1] : Overweight\par SEVEN\par Inadequate SEVEN Rx on APAP via nasal pillows particularly now that patient must sleep supine due to shoulder pain\par Favor Bipap titration and conversion to nocturnal Bipap likely and significantly higher pressures

## 2021-12-02 ENCOUNTER — APPOINTMENT (OUTPATIENT)
Dept: PULMONOLOGY | Facility: CLINIC | Age: 76
End: 2021-12-02
Payer: MEDICARE

## 2021-12-02 VITALS — DIASTOLIC BLOOD PRESSURE: 70 MMHG | BODY MASS INDEX: 29.16 KG/M2 | WEIGHT: 215 LBS | SYSTOLIC BLOOD PRESSURE: 120 MMHG

## 2021-12-02 VITALS — OXYGEN SATURATION: 95 % | HEART RATE: 75 BPM | RESPIRATION RATE: 16 BRPM

## 2021-12-02 PROCEDURE — 99213 OFFICE O/P EST LOW 20 MIN: CPT

## 2021-12-02 NOTE — HISTORY OF PRESENT ILLNESS
[Obstructive Sleep Apnea] : obstructive sleep apnea [Awakes Unrefreshed] : awakes unrefreshed [Daytime Somnolence] : daytime somnolence [Snoring] : snoring [TextBox_4] : Overweight, hypertensive male on CPAP for SEVEN for at least 10 years seen for sleep evaluation and management \par Originally diagnosed in the 1990's at [facility]\par Last seen by Dr Mota about 2014\par Has a Resmed S10 set at 13 cm\par May need a slightly higher pressure\par Uses nasal pillows\par [employer] is his DME\par Due to shoulder pain spending more time sleeping supine than usual  \par \par \par 12/2/21\par Better with APAP than without\par Not ideal  [ESS] : 8

## 2021-12-02 NOTE — DISCUSSION/SUMMARY
[FreeTextEntry1] : Overweight \par SEVEN\par Better on APAP than without\par Treatment emergent central sleep apnea leaving treated AHI >14\par Essentially a APAP failure\par Bipap titration done\par Will order Aircurve VAuto - autoBipap to achieve a better AHI

## 2022-01-10 ENCOUNTER — TRANSCRIPTION ENCOUNTER (OUTPATIENT)
Age: 77
End: 2022-01-10

## 2022-01-10 ENCOUNTER — RX RENEWAL (OUTPATIENT)
Age: 77
End: 2022-01-10

## 2022-01-17 PROBLEM — F41.9 ANXIETY DISORDER, UNSPECIFIED: Chronic | Status: ACTIVE | Noted: 2021-11-21

## 2022-01-17 PROBLEM — G47.33 OBSTRUCTIVE SLEEP APNEA (ADULT) (PEDIATRIC): Chronic | Status: ACTIVE | Noted: 2021-11-21

## 2022-01-17 PROBLEM — J44.9 CHRONIC OBSTRUCTIVE PULMONARY DISEASE, UNSPECIFIED: Chronic | Status: ACTIVE | Noted: 2021-11-21

## 2022-01-17 PROBLEM — E66.3 OVERWEIGHT: Chronic | Status: ACTIVE | Noted: 2021-11-21

## 2022-01-17 PROBLEM — N40.0 BENIGN PROSTATIC HYPERPLASIA WITHOUT LOWER URINARY TRACT SYMPTOMS: Chronic | Status: ACTIVE | Noted: 2021-11-21

## 2022-01-17 PROBLEM — I10 ESSENTIAL (PRIMARY) HYPERTENSION: Chronic | Status: ACTIVE | Noted: 2021-11-21

## 2022-01-20 ENCOUNTER — APPOINTMENT (OUTPATIENT)
Dept: FAMILY MEDICINE | Facility: CLINIC | Age: 77
End: 2022-01-20
Payer: MEDICARE

## 2022-01-20 ENCOUNTER — RESULT REVIEW (OUTPATIENT)
Age: 77
End: 2022-01-20

## 2022-01-20 VITALS
BODY MASS INDEX: 29.26 KG/M2 | TEMPERATURE: 98.4 F | SYSTOLIC BLOOD PRESSURE: 130 MMHG | OXYGEN SATURATION: 97 % | WEIGHT: 216 LBS | HEIGHT: 72 IN | HEART RATE: 78 BPM | DIASTOLIC BLOOD PRESSURE: 80 MMHG

## 2022-01-20 VITALS — HEART RATE: 72 BPM | DIASTOLIC BLOOD PRESSURE: 80 MMHG | SYSTOLIC BLOOD PRESSURE: 124 MMHG | RESPIRATION RATE: 16 BRPM

## 2022-01-20 DIAGNOSIS — R22.1 LOCALIZED SWELLING, MASS AND LUMP, NECK: ICD-10-CM

## 2022-01-20 DIAGNOSIS — R10.11 RIGHT UPPER QUADRANT PAIN: ICD-10-CM

## 2022-01-20 PROCEDURE — 99214 OFFICE O/P EST MOD 30 MIN: CPT | Mod: 25

## 2022-01-20 PROCEDURE — G0444 DEPRESSION SCREEN ANNUAL: CPT | Mod: 59

## 2022-01-20 NOTE — PHYSICAL EXAM
[No Acute Distress] : no acute distress [PERRL] : pupils equal round and reactive to light [Normal TMs] : both tympanic membranes were normal [Supple] : supple [Clear to Auscultation] : lungs were clear to auscultation bilaterally [Regular Rhythm] : with a regular rhythm [No Edema] : there was no peripheral edema [Soft] : abdomen soft [Non Tender] : non-tender [Non-distended] : non-distended [No HSM] : no HSM [Normal Bowel Sounds] : normal bowel sounds [Normal Supraclavicular Nodes] : no supraclavicular lymphadenopathy [Normal Posterior Cervical Nodes] : no posterior cervical lymphadenopathy [Normal Anterior Cervical Nodes] : no anterior cervical lymphadenopathy [Normal] : no posterior cervical lymphadenopathy and no anterior cervical lymphadenopathy [No CVA Tenderness] : no CVA  tenderness [No Joint Swelling] : no joint swelling [No Rash] : no rash [Normal Gait] : normal gait [Normal Affect] : the affect was normal [de-identified] : 2cm mass  left anterior cervical area  [de-identified] : diathesis of rectus abdominis

## 2022-01-20 NOTE — HISTORY OF PRESENT ILLNESS
[Diabetes Mellitus] : Diabetes Mellitus [Hyperlipidemia] : Hyperlipidemia [Hypertension] : Hypertension [Other: ___] : [unfilled] [Check glucose ___ x/week] : Patient checks blood glucose [unfilled]  times a week [Most Recent A1C: ___] : Most recent A1C was [unfilled] [Checks BP Regularly] : The patient checks ~his/her~ blood pressure regularly [<140/90] : Target blood pressure is <140/90 [Target goal met] : BP target goal met [Doing Well] : Patient is doing well [Low Intensity Therapy] : Patient is currently on low intensity statin  therapy [FreeTextEntry6] : using  c-pap has feeling  using advair no rescue s no seizures  pt had episode of ruq pain after  eating hamburger  pain lasted 5  days no temp no vomiting but constipated pain  better now has  hx of  kidney stones

## 2022-01-20 NOTE — ASSESSMENT
[FreeTextEntry1] : abd pain hx of pancreatic  cyst  now having  abd pain will order mri of abd and pelvis hx or renal stone 2 cm left anterior cervical mass ct of neck bp  acceptable continue metoprolol  dm at goal continue metformin

## 2022-01-21 ENCOUNTER — TRANSCRIPTION ENCOUNTER (OUTPATIENT)
Age: 77
End: 2022-01-21

## 2022-01-21 ENCOUNTER — APPOINTMENT (OUTPATIENT)
Dept: CT IMAGING | Facility: CLINIC | Age: 77
End: 2022-01-21
Payer: MEDICARE

## 2022-01-21 ENCOUNTER — APPOINTMENT (OUTPATIENT)
Dept: MRI IMAGING | Facility: CLINIC | Age: 77
End: 2022-01-21
Payer: MEDICARE

## 2022-01-21 LAB
ALBUMIN SERPL ELPH-MCNC: 4.1 G/DL
ALP BLD-CCNC: 81 U/L
ALT SERPL-CCNC: 35 U/L
ANION GAP SERPL CALC-SCNC: 14 MMOL/L
APPEARANCE: CLEAR
AST SERPL-CCNC: 35 U/L
BACTERIA: NEGATIVE
BASOPHILS # BLD AUTO: 0.02 K/UL
BASOPHILS NFR BLD AUTO: 0.2 %
BILIRUB SERPL-MCNC: 0.4 MG/DL
BILIRUBIN URINE: NEGATIVE
BLOOD URINE: NEGATIVE
BUN SERPL-MCNC: 26 MG/DL
CALCIUM SERPL-MCNC: 9.1 MG/DL
CHLORIDE SERPL-SCNC: 103 MMOL/L
CHOLEST SERPL-MCNC: 124 MG/DL
CO2 SERPL-SCNC: 26 MMOL/L
COLOR: YELLOW
CREAT SERPL-MCNC: 1.47 MG/DL
CREAT SPEC-SCNC: 135 MG/DL
EOSINOPHIL # BLD AUTO: 0.09 K/UL
EOSINOPHIL NFR BLD AUTO: 1.1 %
ESTIMATED AVERAGE GLUCOSE: 151 MG/DL
GLUCOSE QUALITATIVE U: NEGATIVE
GLUCOSE SERPL-MCNC: 182 MG/DL
HBA1C MFR BLD HPLC: 6.9 %
HCT VFR BLD CALC: 42.9 %
HDLC SERPL-MCNC: 29 MG/DL
HGB BLD-MCNC: 13.2 G/DL
HYALINE CASTS: 0 /LPF
IMM GRANULOCYTES NFR BLD AUTO: 0.4 %
KETONES URINE: NEGATIVE
LDLC SERPL CALC-MCNC: 53 MG/DL
LEUKOCYTE ESTERASE URINE: NEGATIVE
LYMPHOCYTES # BLD AUTO: 1.3 K/UL
LYMPHOCYTES NFR BLD AUTO: 15.9 %
MAN DIFF?: NORMAL
MCHC RBC-ENTMCNC: 29.6 PG
MCHC RBC-ENTMCNC: 30.8 GM/DL
MCV RBC AUTO: 96.2 FL
MICROALBUMIN 24H UR DL<=1MG/L-MCNC: 1.8 MG/DL
MICROALBUMIN/CREAT 24H UR-RTO: 14 MG/G
MICROSCOPIC-UA: NORMAL
MONOCYTES # BLD AUTO: 0.47 K/UL
MONOCYTES NFR BLD AUTO: 5.7 %
NEUTROPHILS # BLD AUTO: 6.28 K/UL
NEUTROPHILS NFR BLD AUTO: 76.7 %
NITRITE URINE: NEGATIVE
NONHDLC SERPL-MCNC: 95 MG/DL
PH URINE: 5.5
PLATELET # BLD AUTO: 297 K/UL
POTASSIUM SERPL-SCNC: 4.4 MMOL/L
PROT SERPL-MCNC: 7.1 G/DL
PROTEIN URINE: NORMAL
RBC # BLD: 4.46 M/UL
RBC # FLD: 13.8 %
RED BLOOD CELLS URINE: 2 /HPF
SODIUM SERPL-SCNC: 143 MMOL/L
SPECIFIC GRAVITY URINE: 1.02
SQUAMOUS EPITHELIAL CELLS: 1 /HPF
T4 SERPL-MCNC: 6.6 UG/DL
TRIGL SERPL-MCNC: 214 MG/DL
TSH SERPL-ACNC: 1.1 UIU/ML
URATE SERPL-MCNC: 8.3 MG/DL
UROBILINOGEN URINE: NORMAL
WBC # FLD AUTO: 8.19 K/UL
WHITE BLOOD CELLS URINE: 2 /HPF

## 2022-01-21 PROCEDURE — 72197 MRI PELVIS W/O & W/DYE: CPT | Mod: ME

## 2022-01-21 PROCEDURE — G1004: CPT

## 2022-01-21 PROCEDURE — 70490 CT SOFT TISSUE NECK W/O DYE: CPT | Mod: ME

## 2022-01-21 PROCEDURE — 74183 MRI ABD W/O CNTR FLWD CNTR: CPT | Mod: ME

## 2022-01-21 PROCEDURE — A9585: CPT

## 2022-01-22 ENCOUNTER — TRANSCRIPTION ENCOUNTER (OUTPATIENT)
Age: 77
End: 2022-01-22

## 2022-01-24 DIAGNOSIS — E04.1 NONTOXIC SINGLE THYROID NODULE: ICD-10-CM

## 2022-01-24 DIAGNOSIS — E07.9 DISORDER OF THYROID, UNSPECIFIED: ICD-10-CM

## 2022-01-26 ENCOUNTER — OUTPATIENT (OUTPATIENT)
Dept: OUTPATIENT SERVICES | Facility: HOSPITAL | Age: 77
LOS: 1 days | End: 2022-01-26
Payer: MEDICARE

## 2022-01-26 ENCOUNTER — APPOINTMENT (OUTPATIENT)
Dept: ULTRASOUND IMAGING | Facility: CLINIC | Age: 77
End: 2022-01-26
Payer: MEDICARE

## 2022-01-26 DIAGNOSIS — E04.1 NONTOXIC SINGLE THYROID NODULE: ICD-10-CM

## 2022-01-26 DIAGNOSIS — R22.1 LOCALIZED SWELLING, MASS AND LUMP, NECK: ICD-10-CM

## 2022-01-26 DIAGNOSIS — E07.9 DISORDER OF THYROID, UNSPECIFIED: ICD-10-CM

## 2022-01-26 PROCEDURE — 76536 US EXAM OF HEAD AND NECK: CPT

## 2022-01-26 PROCEDURE — 76536 US EXAM OF HEAD AND NECK: CPT | Mod: 26

## 2022-01-31 DIAGNOSIS — M54.9 DORSALGIA, UNSPECIFIED: ICD-10-CM

## 2022-02-01 ENCOUNTER — RX RENEWAL (OUTPATIENT)
Age: 77
End: 2022-02-01

## 2022-02-03 ENCOUNTER — RX RENEWAL (OUTPATIENT)
Age: 77
End: 2022-02-03

## 2022-02-04 ENCOUNTER — TRANSCRIPTION ENCOUNTER (OUTPATIENT)
Age: 77
End: 2022-02-04

## 2022-02-09 ENCOUNTER — TRANSCRIPTION ENCOUNTER (OUTPATIENT)
Age: 77
End: 2022-02-09

## 2022-02-10 ENCOUNTER — TRANSCRIPTION ENCOUNTER (OUTPATIENT)
Age: 77
End: 2022-02-10

## 2022-02-10 ENCOUNTER — APPOINTMENT (OUTPATIENT)
Dept: FAMILY MEDICINE | Facility: CLINIC | Age: 77
End: 2022-02-10
Payer: MEDICARE

## 2022-02-10 VITALS
BODY MASS INDEX: 28.85 KG/M2 | TEMPERATURE: 97.9 F | OXYGEN SATURATION: 96 % | HEIGHT: 72 IN | SYSTOLIC BLOOD PRESSURE: 134 MMHG | DIASTOLIC BLOOD PRESSURE: 76 MMHG | WEIGHT: 213 LBS | HEART RATE: 68 BPM

## 2022-02-10 VITALS — HEART RATE: 72 BPM | DIASTOLIC BLOOD PRESSURE: 70 MMHG | RESPIRATION RATE: 16 BRPM | SYSTOLIC BLOOD PRESSURE: 120 MMHG

## 2022-02-10 DIAGNOSIS — K85.80 OTHER ACUTE PANCREATITIS WITHOUT NECROSIS OR INFECTION: ICD-10-CM

## 2022-02-10 PROCEDURE — 93000 ELECTROCARDIOGRAM COMPLETE: CPT | Mod: 59

## 2022-02-10 PROCEDURE — 99497 ADVNCD CARE PLAN 30 MIN: CPT

## 2022-02-10 PROCEDURE — G0439: CPT

## 2022-02-10 PROCEDURE — G0444 DEPRESSION SCREEN ANNUAL: CPT | Mod: 59

## 2022-02-10 PROCEDURE — 99214 OFFICE O/P EST MOD 30 MIN: CPT | Mod: 25

## 2022-02-10 NOTE — REVIEW OF SYSTEMS
[Patient Intake Form Reviewed] : Patient intake form was reviewed [Shortness Of Breath] : shortness of breath [Abdominal Pain] : abdominal pain [Nocturia] : nocturia [Frequency] : frequency [Negative] : Heme/Lymph

## 2022-02-10 NOTE — PHYSICAL EXAM
[No Acute Distress] : no acute distress [PERRL] : pupils equal round and reactive to light [Normal TMs] : both tympanic membranes were normal [Supple] : supple [Clear to Auscultation] : lungs were clear to auscultation bilaterally [Regular Rhythm] : with a regular rhythm [No Edema] : there was no peripheral edema [Soft] : abdomen soft [Non Tender] : non-tender [Non-distended] : non-distended [No HSM] : no HSM [Normal Bowel Sounds] : normal bowel sounds [Normal Supraclavicular Nodes] : no supraclavicular lymphadenopathy [Normal Posterior Cervical Nodes] : no posterior cervical lymphadenopathy [Normal Anterior Cervical Nodes] : no anterior cervical lymphadenopathy [Normal] : no posterior cervical lymphadenopathy and no anterior cervical lymphadenopathy [No CVA Tenderness] : no CVA  tenderness [No Joint Swelling] : no joint swelling [No Rash] : no rash [Normal Gait] : normal gait [Normal Affect] : the affect was normal [de-identified] : diathesis of rectus abdominis

## 2022-02-10 NOTE — ASSESSMENT
[FreeTextEntry1] : asthma  stable using advair 1 x a day dm well controlled continue metformin no seizures continue lamotigine  hld continue simvastatin htn continue metoprolol recent pancreatitis  repeat ct scan 2 wks check amylase and lipase

## 2022-02-10 NOTE — HEALTH RISK ASSESSMENT
[Very Good] : ~his/her~  mood as very good [Never] : Never [2 - 4 times a month (2 pts)] : 2-4 times a month (2 points) [0] : 2) Feeling down, depressed, or hopeless: Not at all (0) [With Family] : lives with family [Retired] : retired [College] : College [] :  [# Of Children ___] : has [unfilled] children [Reports changes in hearing] : Reports changes in hearing [Smoke Detector] : smoke detector [Carbon Monoxide Detector] : carbon monoxide detector [Seat Belt] :  uses seat belt [Time Spent: ___ minutes] : Time Spent: [unfilled] minutes [de-identified] :  AND CARDIOLOGY  [de-identified] : NO  [DHZ8Xtath] : 0 [Change in mental status noted] : No change in mental status noted [Reports changes in vision] : Reports no changes in vision [Reports changes in dental health] : Reports no changes in dental health [ColonoscopyDate] : 1/18 [ColonoscopyComments] : POLYPS  [de-identified] :   [de-identified] : CATARACTS  [AdvancecareDate] : 2/22 [FreeTextEntry4] : 16 minutes  spent discussing  end of life care and options for treatment such as, a DNR, DNI, dialysis, tube feeds and if patient becomes unable to make decisions for self and has incurable disease that treatment outweighs benefits.no dnr \par

## 2022-02-10 NOTE — HISTORY OF PRESENT ILLNESS
[FreeTextEntry1] : pt here for cpe and management of pancreatitis DM  HTN HLD SEIZURE [de-identified] : PT HAS SEVEN  AND HAS  PAIN IN RT SHOULDER WHEN LYING ON LEFT SHOULDER

## 2022-02-13 ENCOUNTER — TRANSCRIPTION ENCOUNTER (OUTPATIENT)
Age: 77
End: 2022-02-13

## 2022-02-13 LAB
AMYLASE/CREAT SERPL: 94 U/L
LPL SERPL-CCNC: 37 U/L

## 2022-02-23 ENCOUNTER — APPOINTMENT (OUTPATIENT)
Dept: CT IMAGING | Facility: CLINIC | Age: 77
End: 2022-02-23
Payer: MEDICARE

## 2022-02-23 ENCOUNTER — OUTPATIENT (OUTPATIENT)
Dept: OUTPATIENT SERVICES | Facility: HOSPITAL | Age: 77
LOS: 1 days | End: 2022-02-23
Payer: MEDICARE

## 2022-02-23 DIAGNOSIS — K85.80 OTHER ACUTE PANCREATITIS WITHOUT NECROSIS OR INFECTION: ICD-10-CM

## 2022-02-23 PROCEDURE — G1004: CPT

## 2022-02-23 PROCEDURE — 82565 ASSAY OF CREATININE: CPT

## 2022-02-23 PROCEDURE — 74177 CT ABD & PELVIS W/CONTRAST: CPT | Mod: 26,ME

## 2022-02-23 PROCEDURE — 74177 CT ABD & PELVIS W/CONTRAST: CPT | Mod: ME

## 2022-02-24 ENCOUNTER — TRANSCRIPTION ENCOUNTER (OUTPATIENT)
Age: 77
End: 2022-02-24

## 2022-03-01 ENCOUNTER — TRANSCRIPTION ENCOUNTER (OUTPATIENT)
Age: 77
End: 2022-03-01

## 2022-03-24 ENCOUNTER — APPOINTMENT (OUTPATIENT)
Dept: FAMILY MEDICINE | Facility: CLINIC | Age: 77
End: 2022-03-24
Payer: MEDICARE

## 2022-03-24 VITALS
WEIGHT: 213.13 LBS | OXYGEN SATURATION: 98 % | DIASTOLIC BLOOD PRESSURE: 74 MMHG | BODY MASS INDEX: 28.87 KG/M2 | HEIGHT: 72 IN | HEART RATE: 68 BPM | SYSTOLIC BLOOD PRESSURE: 126 MMHG | TEMPERATURE: 97.8 F

## 2022-03-24 PROCEDURE — 99214 OFFICE O/P EST MOD 30 MIN: CPT

## 2022-03-24 NOTE — ASSESSMENT
[Patient Optimized for Surgery] : Patient optimized for surgery [No Further Testing Recommended] : no further testing recommended [FreeTextEntry4] : Patient will hold aspirin 10 days prior to procedure.  Will hold Metformin evening before procedure\par \par

## 2022-03-24 NOTE — HISTORY OF PRESENT ILLNESS
[COPD] : COPD [No Adverse Anesthesia Reaction] : no adverse anesthesia reaction in self or family member [Chronic Anticoagulation] : no chronic anticoagulation [Chronic Kidney Disease] : no chronic kidney disease [Diabetes] : diabetes [(Patient denies any chest pain, claudication, dyspnea on exertion, orthopnea, palpitations or syncope)] : Patient denies any chest pain, claudication, dyspnea on exertion, orthopnea, palpitations or syncope [FreeTextEntry1] : ERCP [FreeTextEntry2] : April 4 [FreeTextEntry3] : [facility] [FreeTextEntry4] : Patient has had a pancreatic abnormality being followed since 2017 recently a new lesion has been noted and he is to undergo ERCP\par \par History of seizure disorder in the distant past has not had any seizures in the past several years\par \par History of mitral valve repair.  No coronary artery disease.  He will be cleared by cardiology for this procedure as well\par \par Diabetes on Metformin 1000 mg once a day he will hold his Metformin evening before procedure\par \par Patient uses BiPAP nightly may need BiPAP pre and post procedure\par \par COPD well-controlled with 1 a day Advair [FreeTextEntry5] : Mitral valve repair 2010

## 2022-03-24 NOTE — REVIEW OF SYSTEMS
[Back Pain] : back pain [Negative] : Genitourinary [FreeTextEntry9] : Back pain controlled with daily stretching

## 2022-04-05 ENCOUNTER — TRANSCRIPTION ENCOUNTER (OUTPATIENT)
Age: 77
End: 2022-04-05

## 2022-05-29 ENCOUNTER — NON-APPOINTMENT (OUTPATIENT)
Age: 77
End: 2022-05-29

## 2022-06-29 ENCOUNTER — NON-APPOINTMENT (OUTPATIENT)
Age: 77
End: 2022-06-29

## 2022-07-12 ENCOUNTER — NON-APPOINTMENT (OUTPATIENT)
Age: 77
End: 2022-07-12

## 2022-08-02 ENCOUNTER — RX RENEWAL (OUTPATIENT)
Age: 77
End: 2022-08-02

## 2022-08-18 ENCOUNTER — TRANSCRIPTION ENCOUNTER (OUTPATIENT)
Age: 77
End: 2022-08-18

## 2022-08-22 ENCOUNTER — TRANSCRIPTION ENCOUNTER (OUTPATIENT)
Age: 77
End: 2022-08-22

## 2022-08-24 ENCOUNTER — APPOINTMENT (OUTPATIENT)
Dept: PULMONOLOGY | Facility: CLINIC | Age: 77
End: 2022-08-24

## 2022-08-24 VITALS
BODY MASS INDEX: 28.85 KG/M2 | OXYGEN SATURATION: 95 % | WEIGHT: 213 LBS | RESPIRATION RATE: 16 BRPM | SYSTOLIC BLOOD PRESSURE: 132 MMHG | DIASTOLIC BLOOD PRESSURE: 80 MMHG | HEIGHT: 72 IN | HEART RATE: 65 BPM

## 2022-08-24 PROCEDURE — 99213 OFFICE O/P EST LOW 20 MIN: CPT

## 2022-08-24 NOTE — DISCUSSION/SUMMARY
[FreeTextEntry1] : Overweight \par SEVEN and treatment emergent CSA\par Better on APAP than without\par Treatment emergent central sleep apnea leaving treated AHI >14\par Essentially a APAP failure\par Bipap titration done\par Aircurve VAuto - autoBipap to achieve a better AHI

## 2022-08-24 NOTE — HISTORY OF PRESENT ILLNESS
[Obstructive Sleep Apnea] : obstructive sleep apnea [Awakes Unrefreshed] : awakes unrefreshed [Daytime Somnolence] : daytime somnolence [Snoring] : snoring [TextBox_4] : Overweight, hypertensive male on CPAP for SEVEN for at least 10 years seen for sleep evaluation and management \par Originally diagnosed in the 1990's at [place]\par Last seen by Dr Mota about 2014\par Has a Resmed S10 set at 13 cm\par May need a slightly higher pressure\par Uses nasal pillows\par [employer] is his DME\par Due to shoulder pain spending more time sleeping supine than usual  \par \par \par 12/2/21\par Better with APAP than without\par Not ideal \par \par 8/24/22\par Less tired\par AHI on VAuto still 16\par Prefers Patel LT pillows \par Trouble with VAuto - [facility] thinks CPAP was adequate  [ESS] : 8

## 2022-10-11 ENCOUNTER — APPOINTMENT (OUTPATIENT)
Dept: FAMILY MEDICINE | Facility: CLINIC | Age: 77
End: 2022-10-11

## 2022-10-11 VITALS
TEMPERATURE: 97 F | HEIGHT: 72 IN | SYSTOLIC BLOOD PRESSURE: 128 MMHG | WEIGHT: 212 LBS | DIASTOLIC BLOOD PRESSURE: 80 MMHG | HEART RATE: 70 BPM | OXYGEN SATURATION: 96 % | BODY MASS INDEX: 28.71 KG/M2

## 2022-10-11 VITALS — DIASTOLIC BLOOD PRESSURE: 76 MMHG | SYSTOLIC BLOOD PRESSURE: 120 MMHG | HEART RATE: 72 BPM | RESPIRATION RATE: 16 BRPM

## 2022-10-11 DIAGNOSIS — Z01.818 ENCOUNTER FOR OTHER PREPROCEDURAL EXAMINATION: ICD-10-CM

## 2022-10-11 DIAGNOSIS — H26.9 UNSPECIFIED CATARACT: ICD-10-CM

## 2022-10-11 PROCEDURE — 99214 OFFICE O/P EST MOD 30 MIN: CPT

## 2022-10-11 RX ORDER — TRAMADOL HYDROCHLORIDE 50 MG/1
50 TABLET, COATED ORAL
Qty: 40 | Refills: 0 | Status: COMPLETED | COMMUNITY
Start: 2022-01-31 | End: 2022-10-11

## 2022-10-11 NOTE — REVIEW OF SYSTEMS
[Fever] : no fever [Chills] : no chills [Pain] : no pain [Earache] : no earache [Sore Throat] : no sore throat [Chest Pain] : no chest pain [Palpitations] : no palpitations [Shortness Of Breath] : no shortness of breath [Wheezing] : no wheezing [Cough] : no cough [Abdominal Pain] : no abdominal pain [Diarrhea] : no diarrhea [Dysuria] : no dysuria [Skin Rash] : no skin rash [Headache] : no headache [Dizziness] : no dizziness [Swollen Glands] : no swollen glands

## 2022-10-11 NOTE — CONSULT LETTER
[Dear  ___] : Dear  [unfilled], [Courtesy Letter:] : I had the pleasure of seeing your patient, [unfilled], in my office today. [( Thank you for referring [unfilled] for consultation for _____ )] : Thank you for referring [unfilled] for consultation for [unfilled]

## 2022-10-11 NOTE — HISTORY OF PRESENT ILLNESS
[COPD] : COPD [Sleep Apnea] : sleep apnea [(Patient denies any chest pain, claudication, dyspnea on exertion, orthopnea, palpitations or syncope)] : Patient denies any chest pain, claudication, dyspnea on exertion, orthopnea, palpitations or syncope [Moderate (4-6 METs)] : Moderate (4-6 METs) [Aortic Stenosis] : no aortic stenosis [Chronic Anticoagulation] : no chronic anticoagulation [Chronic Kidney Disease] : no chronic kidney disease [FreeTextEntry1] : cataract  [FreeTextEntry2] : 10/20/22 [FreeTextEntry3] : CHRIS Salamanca  [FreeTextEntry4] : pt here for clearance for cataract surgery.  Pt active medical problems include diabetes htn hld and anca and seizure disorder  [FreeTextEntry5] : mitral valve repair

## 2022-10-11 NOTE — PHYSICAL EXAM
[No Acute Distress] : no acute distress [PERRL] : pupils equal round and reactive to light [Normal TMs] : both tympanic membranes were normal [Supple] : supple [Clear to Auscultation] : lungs were clear to auscultation bilaterally [Regular Rhythm] : with a regular rhythm [No Edema] : there was no peripheral edema [Soft] : abdomen soft [Non Tender] : non-tender [Non-distended] : non-distended [No HSM] : no HSM [Normal Bowel Sounds] : normal bowel sounds [Normal Supraclavicular Nodes] : no supraclavicular lymphadenopathy [Normal Posterior Cervical Nodes] : no posterior cervical lymphadenopathy [Normal Anterior Cervical Nodes] : no anterior cervical lymphadenopathy [Normal] : no posterior cervical lymphadenopathy and no anterior cervical lymphadenopathy [No CVA Tenderness] : no CVA  tenderness [No Joint Swelling] : no joint swelling [No Rash] : no rash [Normal Gait] : normal gait [Normal Affect] : the affect was normal [Normal Insight/Judgement] : insight and judgment were intact [de-identified] : diathesis of rectus abdominis

## 2022-10-11 NOTE — ASSESSMENT
[Patient Optimized for Surgery] : Patient optimized for surgery [Modify medications prior to procedure] : Modify medications prior to procedure [FreeTextEntry4] : acceptable medical condition for surgery\par no seizures in over  15 yrs continue lamotrigine  \par htn  acceptable continue metoprolol hld continue simvastatin check a1c and lipids  [FreeTextEntry7] : hold metformin day before surgery

## 2022-10-12 ENCOUNTER — TRANSCRIPTION ENCOUNTER (OUTPATIENT)
Age: 77
End: 2022-10-12

## 2022-10-12 LAB
ALBUMIN SERPL ELPH-MCNC: 4.4 G/DL
ALP BLD-CCNC: 68 U/L
ALT SERPL-CCNC: 13 U/L
ANION GAP SERPL CALC-SCNC: 14 MMOL/L
APPEARANCE: CLEAR
AST SERPL-CCNC: 15 U/L
BACTERIA: NEGATIVE
BASOPHILS # BLD AUTO: 0.03 K/UL
BASOPHILS NFR BLD AUTO: 0.4 %
BILIRUB SERPL-MCNC: 0.6 MG/DL
BILIRUBIN URINE: NEGATIVE
BLOOD URINE: NEGATIVE
BUN SERPL-MCNC: 25 MG/DL
CALCIUM SERPL-MCNC: 10 MG/DL
CHLORIDE SERPL-SCNC: 101 MMOL/L
CHOLEST SERPL-MCNC: 178 MG/DL
CO2 SERPL-SCNC: 28 MMOL/L
COLOR: YELLOW
CREAT SERPL-MCNC: 1.42 MG/DL
CREAT SPEC-SCNC: 142 MG/DL
EGFR: 51 ML/MIN/1.73M2
EOSINOPHIL # BLD AUTO: 0.14 K/UL
EOSINOPHIL NFR BLD AUTO: 1.7 %
ESTIMATED AVERAGE GLUCOSE: 146 MG/DL
GLUCOSE QUALITATIVE U: NEGATIVE
GLUCOSE SERPL-MCNC: 148 MG/DL
HBA1C MFR BLD HPLC: 6.7 %
HCT VFR BLD CALC: 45.9 %
HDLC SERPL-MCNC: 41 MG/DL
HGB BLD-MCNC: 14.4 G/DL
HYALINE CASTS: 2 /LPF
IMM GRANULOCYTES NFR BLD AUTO: 0.5 %
KETONES URINE: NEGATIVE
LDLC SERPL CALC-MCNC: 87 MG/DL
LEUKOCYTE ESTERASE URINE: ABNORMAL
LYMPHOCYTES # BLD AUTO: 1.77 K/UL
LYMPHOCYTES NFR BLD AUTO: 21.2 %
MAN DIFF?: NORMAL
MCHC RBC-ENTMCNC: 30.1 PG
MCHC RBC-ENTMCNC: 31.4 GM/DL
MCV RBC AUTO: 95.8 FL
MICROALBUMIN 24H UR DL<=1MG/L-MCNC: 1.6 MG/DL
MICROALBUMIN/CREAT 24H UR-RTO: 11 MG/G
MICROSCOPIC-UA: NORMAL
MONOCYTES # BLD AUTO: 0.4 K/UL
MONOCYTES NFR BLD AUTO: 4.8 %
NEUTROPHILS # BLD AUTO: 5.95 K/UL
NEUTROPHILS NFR BLD AUTO: 71.4 %
NITRITE URINE: NEGATIVE
NONHDLC SERPL-MCNC: 137 MG/DL
PH URINE: 5.5
PLATELET # BLD AUTO: 276 K/UL
POTASSIUM SERPL-SCNC: 4.3 MMOL/L
PROT SERPL-MCNC: 7.1 G/DL
PROTEIN URINE: NORMAL
RBC # BLD: 4.79 M/UL
RBC # FLD: 14.6 %
RED BLOOD CELLS URINE: 3 /HPF
SODIUM SERPL-SCNC: 143 MMOL/L
SPECIFIC GRAVITY URINE: 1.02
SQUAMOUS EPITHELIAL CELLS: 1 /HPF
T4 SERPL-MCNC: 6.5 UG/DL
TRIGL SERPL-MCNC: 252 MG/DL
TSH SERPL-ACNC: 1.3 UIU/ML
URATE SERPL-MCNC: 7.4 MG/DL
UROBILINOGEN URINE: NORMAL
WBC # FLD AUTO: 8.33 K/UL
WHITE BLOOD CELLS URINE: 4 /HPF

## 2022-10-16 LAB — LAMOTRIGINE SERPL-MCNC: 4.7 UG/ML

## 2022-10-31 ENCOUNTER — RX RENEWAL (OUTPATIENT)
Age: 77
End: 2022-10-31

## 2022-12-07 ENCOUNTER — TRANSCRIPTION ENCOUNTER (OUTPATIENT)
Age: 77
End: 2022-12-07

## 2022-12-15 ENCOUNTER — APPOINTMENT (OUTPATIENT)
Dept: PULMONOLOGY | Facility: CLINIC | Age: 77
End: 2022-12-15

## 2022-12-15 PROCEDURE — 99213 OFFICE O/P EST LOW 20 MIN: CPT | Mod: 95

## 2022-12-15 NOTE — HISTORY OF PRESENT ILLNESS
[Obstructive Sleep Apnea] : obstructive sleep apnea [Awakes Unrefreshed] : awakes unrefreshed [Daytime Somnolence] : daytime somnolence [Snoring] : snoring [Home] : at home, [unfilled] , at the time of the visit. [Medical Office: ([facility])___] : at the medical office located in  [Verbal consent obtained from patient] : the patient, [unfilled] [TextBox_4] : Overweight, hypertensive male on CPAP for SEVEN for at least 10 years seen for sleep evaluation and management \par Originally diagnosed in the 1990's at [place]\par Last seen by Dr Mota about 2014\par Has a Resmed S10 set at 13 cm\par May need a slightly higher pressure\par Uses nasal pillows\par [employer] is his DME\par Due to shoulder pain spending more time sleeping supine than usual  \par \par \par 12/2/21\par Better with APAP than without\par Not ideal \par \par 8/24/22\par Less tired\par AHI on VAuto still 16\par Prefers Patel LT pillows \par Trouble with VAuto - [facility] thinks CPAP was adequate \par \par 12/15/22\par AHI on machine remain high  [ESS] : 8

## 2022-12-15 NOTE — DISCUSSION/SUMMARY
[FreeTextEntry1] : Overweight \par SEVEN and treatment emergent CSA\par Better on APAP than without\par Treatment emergent central sleep apnea leaving treated AHI >14\par Essentially a APAP failure\par Bipap titration done\par Aircurve VAuto - autoBipap to achieve a better AHI - failed at higher and lower pressures \par Never a smoker - no PFT or CT data - possible asthma - COPD unlikely

## 2022-12-18 ENCOUNTER — RX RENEWAL (OUTPATIENT)
Age: 77
End: 2022-12-18

## 2023-01-03 NOTE — ED ADULT NURSE NOTE - BREATHING, MLM
Shashank last seen Payton Chua NP on 8/30/2022 for anxiety, there is no future office visit listed for her. Dr Johnson last refilled med per PDMP review,on 11/30/2022,sold on 12/1/2022 #30 tabs for 8 days.  Diazepam 2 mg take 1 tablet po every 6 hours prn anxiety.  Dr Johnson is out of office, would someone in  clinic provider refill med below?  [facility]   Spontaneous, unlabored and symmetrical

## 2023-01-11 NOTE — ED ADULT NURSE NOTE - CINV DISCH TEACH PARTICIP
Patient able to open and close right hand, move all fingers, oppose thumb and pinky finger together, able to extend wrist, able to extend thumb, positive radial pulse, patient has tenderness over the medial aspect of elbow, no clavicle or shoulder tenderness
Patient

## 2023-01-27 ENCOUNTER — RX RENEWAL (OUTPATIENT)
Age: 78
End: 2023-01-27

## 2023-01-30 ENCOUNTER — RX RENEWAL (OUTPATIENT)
Age: 78
End: 2023-01-30

## 2023-02-06 ENCOUNTER — RX RENEWAL (OUTPATIENT)
Age: 78
End: 2023-02-06

## 2023-02-16 ENCOUNTER — APPOINTMENT (OUTPATIENT)
Dept: FAMILY MEDICINE | Facility: CLINIC | Age: 78
End: 2023-02-16
Payer: MEDICARE

## 2023-02-16 VITALS — DIASTOLIC BLOOD PRESSURE: 70 MMHG | RESPIRATION RATE: 16 BRPM | SYSTOLIC BLOOD PRESSURE: 110 MMHG | HEART RATE: 16 BPM

## 2023-02-16 VITALS
HEIGHT: 72 IN | WEIGHT: 215 LBS | HEART RATE: 84 BPM | BODY MASS INDEX: 29.12 KG/M2 | SYSTOLIC BLOOD PRESSURE: 136 MMHG | TEMPERATURE: 97 F | DIASTOLIC BLOOD PRESSURE: 85 MMHG | OXYGEN SATURATION: 97 %

## 2023-02-16 DIAGNOSIS — G40.909 EPILEPSY, UNSPECIFIED, NOT INTRACTABLE, W/OUT STATUS EPILEPTICUS: ICD-10-CM

## 2023-02-16 PROCEDURE — 99213 OFFICE O/P EST LOW 20 MIN: CPT | Mod: 25

## 2023-02-16 PROCEDURE — G0439: CPT

## 2023-02-16 PROCEDURE — G0444 DEPRESSION SCREEN ANNUAL: CPT | Mod: 59

## 2023-02-16 PROCEDURE — 99497 ADVNCD CARE PLAN 30 MIN: CPT

## 2023-02-16 RX ORDER — ALBUTEROL SULFATE 90 UG/1
108 (90 BASE) INHALANT RESPIRATORY (INHALATION)
Qty: 8.5 | Refills: 10 | Status: ACTIVE | COMMUNITY
Start: 2022-12-18 | End: 1900-01-01

## 2023-02-16 RX ORDER — TESTOSTERONE CYPIONATE 200 MG/ML
200 VIAL (ML) INTRAMUSCULAR
Refills: 0 | Status: ACTIVE | COMMUNITY
Start: 2023-02-16

## 2023-02-16 RX ORDER — TAMSULOSIN HYDROCHLORIDE 0.4 MG/1
0.4 CAPSULE ORAL
Qty: 90 | Refills: 3 | Status: ACTIVE | COMMUNITY
Start: 2017-10-25

## 2023-02-16 NOTE — CURRENT MEDS
I reviewed the H&P, I examined the patient, and there are no changes in the patient's condition.     [Takes medication as prescribed] : takes

## 2023-02-16 NOTE — REVIEW OF SYSTEMS
[Hearing Loss] : hearing loss [Shortness Of Breath] : shortness of breath [Joint Pain] : joint pain [Muscle Pain] : muscle pain [Negative] : Heme/Lymph

## 2023-02-16 NOTE — ASSESSMENT
[FreeTextEntry1] : dm continue metformin a1c ordered anca  using c=pap bp acceptable continue  metoprolol  seizure continue lomtrigine check level depression in remission continue sertraline

## 2023-02-16 NOTE — HEALTH RISK ASSESSMENT
[Very Good] : ~his/her~  mood as very good [No] : No [No falls in past year] : Patient reported no falls in the past year [0] : 2) Feeling down, depressed, or hopeless: Not at all (0) [PHQ-2 Negative - No further assessment needed] : PHQ-2 Negative - No further assessment needed [None] : None [With Family] : lives with family [Employed] : employed [College] : College [] :  [# Of Children ___] : has [unfilled] children [Fully functional (bathing, dressing, toileting, transferring, walking, feeding)] : Fully functional (bathing, dressing, toileting, transferring, walking, feeding) [Fully functional (using the telephone, shopping, preparing meals, housekeeping, doing laundry, using] : Fully functional and needs no help or supervision to perform IADLs (using the telephone, shopping, preparing meals, housekeeping, doing laundry, using transportation, managing medications and managing finances) [Smoke Detector] : smoke detector [Carbon Monoxide Detector] : carbon monoxide detector [Seat Belt] :  uses seat belt [Patient/Caregiver not ready to engage] : , patient/caregiver not ready to engage [Time Spent: ___ minutes] : Time Spent: [unfilled] minutes [Never] : Never [de-identified] :  no  [[id number]] : 0 [Change in mental status noted] : No change in mental status noted [Reports changes in hearing] : Reports no changes in hearing [Reports changes in vision] : Reports no changes in vision [Reports changes in dental health] : Reports no changes in dental health [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

## 2023-02-16 NOTE — HISTORY OF PRESENT ILLNESS
[Spouse] : spouse [FreeTextEntry1] : pt here for cpe and management of seizure disorder DM asthma htn hld and bph  [de-identified] : feeling  well c/o bilateral knee pain taking 40 mg of advil

## 2023-02-16 NOTE — PHYSICAL EXAM
[No Acute Distress] : no acute distress [PERRL] : pupils equal round and reactive to light [Normal TMs] : both tympanic membranes were normal [Supple] : supple [Clear to Auscultation] : lungs were clear to auscultation bilaterally [Regular Rhythm] : with a regular rhythm [No Edema] : there was no peripheral edema [Normal] : no posterior cervical lymphadenopathy and no anterior cervical lymphadenopathy [No CVA Tenderness] : no CVA  tenderness [No Joint Swelling] : no joint swelling [No Rash] : no rash [Normal Gait] : normal gait [Normal Insight/Judgement] : insight and judgment were intact

## 2023-02-18 ENCOUNTER — TRANSCRIPTION ENCOUNTER (OUTPATIENT)
Age: 78
End: 2023-02-18

## 2023-02-18 LAB
ALBUMIN SERPL ELPH-MCNC: 4.3 G/DL
ALP BLD-CCNC: 70 U/L
ALT SERPL-CCNC: 17 U/L
ANION GAP SERPL CALC-SCNC: 13 MMOL/L
APPEARANCE: CLEAR
AST SERPL-CCNC: 13 U/L
BACTERIA: NEGATIVE
BASOPHILS # BLD AUTO: 0.03 K/UL
BASOPHILS NFR BLD AUTO: 0.4 %
BILIRUB SERPL-MCNC: 0.7 MG/DL
BILIRUBIN URINE: NEGATIVE
BLOOD URINE: NEGATIVE
BUN SERPL-MCNC: 26 MG/DL
CALCIUM SERPL-MCNC: 10.1 MG/DL
CHLORIDE SERPL-SCNC: 106 MMOL/L
CHOLEST SERPL-MCNC: 188 MG/DL
CO2 SERPL-SCNC: 26 MMOL/L
COLOR: NORMAL
CREAT SERPL-MCNC: 1.51 MG/DL
CREAT SPEC-SCNC: 107 MG/DL
EGFR: 48 ML/MIN/1.73M2
EOSINOPHIL # BLD AUTO: 0.11 K/UL
EOSINOPHIL NFR BLD AUTO: 1.4 %
ESTIMATED AVERAGE GLUCOSE: 148 MG/DL
GLUCOSE QUALITATIVE U: NEGATIVE
GLUCOSE SERPL-MCNC: 113 MG/DL
HBA1C MFR BLD HPLC: 6.8 %
HCT VFR BLD CALC: 42 %
HDLC SERPL-MCNC: 41 MG/DL
HGB BLD-MCNC: 13.4 G/DL
HYALINE CASTS: 1 /LPF
IMM GRANULOCYTES NFR BLD AUTO: 0.6 %
KETONES URINE: NEGATIVE
LDLC SERPL CALC-MCNC: 83 MG/DL
LEUKOCYTE ESTERASE URINE: NEGATIVE
LYMPHOCYTES # BLD AUTO: 1.81 K/UL
LYMPHOCYTES NFR BLD AUTO: 22.4 %
MAN DIFF?: NORMAL
MCHC RBC-ENTMCNC: 30.7 PG
MCHC RBC-ENTMCNC: 31.9 GM/DL
MCV RBC AUTO: 96.1 FL
MICROALBUMIN 24H UR DL<=1MG/L-MCNC: 1.2 MG/DL
MICROALBUMIN/CREAT 24H UR-RTO: 11 MG/G
MICROSCOPIC-UA: NORMAL
MONOCYTES # BLD AUTO: 0.55 K/UL
MONOCYTES NFR BLD AUTO: 6.8 %
NEUTROPHILS # BLD AUTO: 5.54 K/UL
NEUTROPHILS NFR BLD AUTO: 68.4 %
NITRITE URINE: NEGATIVE
NONHDLC SERPL-MCNC: 147 MG/DL
PH URINE: 5.5
PLATELET # BLD AUTO: 258 K/UL
POTASSIUM SERPL-SCNC: 4.5 MMOL/L
PROT SERPL-MCNC: 7.2 G/DL
PROTEIN URINE: NEGATIVE
RBC # BLD: 4.37 M/UL
RBC # FLD: 14.4 %
RED BLOOD CELLS URINE: 1 /HPF
SODIUM SERPL-SCNC: 145 MMOL/L
SPECIFIC GRAVITY URINE: 1.02
SQUAMOUS EPITHELIAL CELLS: 1 /HPF
T4 SERPL-MCNC: 6.1 UG/DL
TESTOST SERPL-MCNC: 284 NG/DL
TRIGL SERPL-MCNC: 322 MG/DL
TSH SERPL-ACNC: 1.38 UIU/ML
URATE SERPL-MCNC: 6.1 MG/DL
UROBILINOGEN URINE: NORMAL
WBC # FLD AUTO: 8.09 K/UL
WHITE BLOOD CELLS URINE: 1 /HPF

## 2023-02-21 LAB — LAMOTRIGINE SERPL-MCNC: 4.3 UG/ML

## 2023-02-22 ENCOUNTER — APPOINTMENT (OUTPATIENT)
Dept: PULMONOLOGY | Facility: CLINIC | Age: 78
End: 2023-02-22
Payer: MEDICARE

## 2023-02-22 DIAGNOSIS — E66.3 OVERWEIGHT: ICD-10-CM

## 2023-02-22 DIAGNOSIS — J44.9 CHRONIC OBSTRUCTIVE PULMONARY DISEASE, UNSPECIFIED: ICD-10-CM

## 2023-02-22 DIAGNOSIS — J30.2 OTHER SEASONAL ALLERGIC RHINITIS: ICD-10-CM

## 2023-02-22 DIAGNOSIS — G47.39 OTHER SLEEP APNEA: ICD-10-CM

## 2023-02-22 PROCEDURE — 99213 OFFICE O/P EST LOW 20 MIN: CPT | Mod: 95

## 2023-02-22 NOTE — DISCUSSION/SUMMARY
[FreeTextEntry1] : Overweight \par SEVEN and treatment emergent CSA\par Better on APAP than without\par Treatment emergent central apnea noted in the past  \par Current 6.9 Centrals and 10.1 hypopneas/hour

## 2023-02-22 NOTE — HISTORY OF PRESENT ILLNESS
[Obstructive Sleep Apnea] : obstructive sleep apnea [Awakes Unrefreshed] : awakes unrefreshed [Daytime Somnolence] : daytime somnolence [Snoring] : snoring [Home] : at home, [unfilled] , at the time of the visit. [Medical Office: ([facility])___] : at the medical office located in  [Verbal consent obtained from patient] : the patient, [unfilled] [TextBox_4] : Overweight, hypertensive male on CPAP for SEVEN for at least 10 years seen for sleep evaluation and management \par Originally diagnosed in the 1990's at [facility]\par Last seen by Dr Mota about 2014\par Has a Resmed S10 set at 13 cm\par May need a slightly higher pressure\par Uses nasal pillows\par [employer] is his DME\par Due to shoulder pain spending more time sleeping supine than usual  \par \par \par 12/2/21\par Better with APAP than without\par Not ideal \par \par 8/24/22\par Less tired\par AHI on VAuto still 16\par Prefers Patel LT pillows \par Trouble with VAuto - [facility] thinks CPAP was adequate \par \par 12/15/22\par AHI on machine remain high \par \par 2/22/23\par Compliant with and benefiting from nocturnal CPAP at 8 cm H20\par AHI still high\par Sleeping well and definitely better than without CPAP \par  [ESS] : 8

## 2023-03-21 ENCOUNTER — TRANSCRIPTION ENCOUNTER (OUTPATIENT)
Age: 78
End: 2023-03-21

## 2023-03-21 DIAGNOSIS — U07.1 COVID-19: ICD-10-CM

## 2023-03-24 ENCOUNTER — TRANSCRIPTION ENCOUNTER (OUTPATIENT)
Age: 78
End: 2023-03-24

## 2023-05-03 LAB — HEMOCCULT STL QL IA: POSITIVE

## 2023-06-29 ENCOUNTER — TRANSCRIPTION ENCOUNTER (OUTPATIENT)
Age: 78
End: 2023-06-29

## 2023-07-25 ENCOUNTER — TRANSCRIPTION ENCOUNTER (OUTPATIENT)
Age: 78
End: 2023-07-25

## 2024-01-19 ENCOUNTER — APPOINTMENT (OUTPATIENT)
Dept: OTOLARYNGOLOGY | Facility: CLINIC | Age: 79
End: 2024-01-19
Payer: MEDICARE

## 2024-01-19 VITALS — BODY MASS INDEX: 29.12 KG/M2 | WEIGHT: 215 LBS | TEMPERATURE: 97 F | HEIGHT: 72 IN

## 2024-01-19 DIAGNOSIS — H69.93 UNSPECIFIED EUSTACHIAN TUBE DISORDER, BILATERAL: ICD-10-CM

## 2024-01-19 DIAGNOSIS — J34.2 DEVIATED NASAL SEPTUM: ICD-10-CM

## 2024-01-19 DIAGNOSIS — H90.3 SENSORINEURAL HEARING LOSS, BILATERAL: ICD-10-CM

## 2024-01-19 DIAGNOSIS — J37.0 CHRONIC LARYNGITIS: ICD-10-CM

## 2024-01-19 PROCEDURE — 31231 NASAL ENDOSCOPY DX: CPT

## 2024-01-19 PROCEDURE — 92567 TYMPANOMETRY: CPT

## 2024-01-19 PROCEDURE — 99204 OFFICE O/P NEW MOD 45 MIN: CPT | Mod: 25

## 2024-01-19 PROCEDURE — 92557 COMPREHENSIVE HEARING TEST: CPT

## 2024-01-19 NOTE — ASSESSMENT
[FreeTextEntry1] : laryngeal signs reflux ?pnd deviated septum audio moderate au sn loss good discrim rec hearing aid eval Advair may be contributing factor sleep apnea cpap add humidfier to bedroom f/u 6 weeks consider rx pnd

## 2024-01-19 NOTE — REVIEW OF SYSTEMS
[Hearing Loss] : hearing loss [Hoarseness] : hoarseness [Throat Clearing] : throat clearing [Negative] : Mouth and Throat

## 2024-01-19 NOTE — HISTORY OF PRESENT ILLNESS
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

## 2024-01-19 NOTE — PROCEDURE
[de-identified] : Indication for procedure:  Unable to examine laryngeal structures with mirror exam, persistent hoarseness The patient has raspy voice  Scope # 86 Topical anesthesia with viscous xylocaine 2% is applied to the anterior nares. A flexible fiberoptic laryngoscope is than introduced through the nares. The nasopharynx is clear without mass or inflammation. The posterior pharyngeal wall is unremarkable. The tongue base and vallecula are unremarkable.  The hypopharynx id clear and unobstructed. The supraglottic larynx is within normal limits. Both vocal cords are fully mobile with diffuse bilateral vocal cord edema.   posterior laryngeal edema 2 plus No Nodules or other lesions are noted. There is no edema or erythema overlying the arytenoid cartilages or the inter-arytenoid space. The subglottic space is clear. The voice has a raspy quality.

## 2024-01-19 NOTE — CONSULT LETTER
[Consult Letter:] : I had the pleasure of evaluating your patient, [unfilled]. [Please see my note below.] : Please see my note below. [Consult Closing:] : Thank you very much for allowing me to participate in the care of this patient.  If you have any questions, please do not hesitate to contact me. [Sincerely,] : Sincerely, [FreeTextEntry1] : Dear Dr. BISI MARTINEZ,  Thank you for your kind referral. Please refer to my enclosed office notes for MATT PALACIO . If there are any questions free to contact me. [FreeTextEntry3] : Simone De Luna MD, FACS

## 2024-01-22 ENCOUNTER — RX RENEWAL (OUTPATIENT)
Age: 79
End: 2024-01-22

## 2024-01-25 ENCOUNTER — APPOINTMENT (OUTPATIENT)
Dept: PHARMACY | Facility: CLINIC | Age: 79
End: 2024-01-25

## 2024-01-31 ENCOUNTER — APPOINTMENT (OUTPATIENT)
Dept: PHARMACY | Facility: CLINIC | Age: 79
End: 2024-01-31
Payer: SELF-PAY

## 2024-01-31 PROCEDURE — V5010 ASSESSMENT FOR HEARING AID: CPT | Mod: NC

## 2024-01-31 NOTE — HISTORY OF PRESENT ILLNESS
[de-identified] : Pt in for HAE, accompanied by wife.  Pt referred by Dr De Luna, clearance provided.  Audio from that visit reveals, essen hearing WNL up to 1kHz, followd by a mild to mod-severe SNHL in right ear and a mild HL at 250Hz, rising to WNL 500Hz-1kHz, followed by a mild to mod-severe SNHL in left ear.  WRS= 100% and 88% for the right and left ears respectively.  Type A tymps bilaterally.  Pt reports hearing loss for the last few years, especially in group-like settings.  Pt reports use of captions, when watching TV.  Pt denies any ear drainage, dizziness, otalgia or tinnitus at this time.  Previous use of HAs denied.  HHIA not completed in full.

## 2024-02-01 RX ORDER — BLOOD SUGAR DIAGNOSTIC
STRIP MISCELLANEOUS
Qty: 1 | Refills: 1 | Status: ACTIVE | COMMUNITY
Start: 2024-01-30 | End: 1900-01-01

## 2024-02-01 RX ORDER — BLOOD-GLUCOSE METER
W/DEVICE KIT MISCELLANEOUS
Qty: 1 | Refills: 0 | Status: ACTIVE | COMMUNITY
Start: 2024-01-30 | End: 1900-01-01

## 2024-02-05 RX ORDER — LANCETS 28 GAUGE
EACH MISCELLANEOUS
Qty: 1 | Refills: 1 | Status: ACTIVE | COMMUNITY
Start: 2024-01-30 | End: 1900-01-01

## 2024-02-14 ENCOUNTER — RX RENEWAL (OUTPATIENT)
Age: 79
End: 2024-02-14

## 2024-02-19 ENCOUNTER — APPOINTMENT (OUTPATIENT)
Dept: PHARMACY | Facility: CLINIC | Age: 79
End: 2024-02-19
Payer: COMMERCIAL

## 2024-02-19 PROCEDURE — V5261C: CUSTOM

## 2024-02-19 NOTE — HISTORY OF PRESENT ILLNESS
[de-identified] : Pt in for HAD, accompanied by wife.  Pt referred by Dr De Luna, clearance provided.  Audio from that visit reveals, essen hearing WNL up to 1kHz, followd by a mild to mod-severe SNHL in right ear and a mild HL at 250Hz, rising to WNL 500Hz-1kHz, followed by a mild to mod-severe SNHL in left ear.  WRS= 100% and 88% for the right and left ears respectively.  Type A tymps bilaterally.  Pt reports hearing loss for the last few years, especially in group-like settings.  Pt reports use of captions, when watching TV.  Pt denies any ear drainage, dizziness, otalgia or tinnitus at this time.  Previous use of HAs denied.  HHIA not completed in full.

## 2024-03-01 ENCOUNTER — APPOINTMENT (OUTPATIENT)
Dept: OTOLARYNGOLOGY | Facility: CLINIC | Age: 79
End: 2024-03-01
Payer: MEDICARE

## 2024-03-01 VITALS — HEIGHT: 72 IN | TEMPERATURE: 97.9 F | WEIGHT: 215 LBS | BODY MASS INDEX: 29.12 KG/M2

## 2024-03-01 DIAGNOSIS — R09.82 POSTNASAL DRIP: ICD-10-CM

## 2024-03-01 DIAGNOSIS — K21.9 GASTRO-ESOPHAGEAL REFLUX DISEASE W/OUT ESOPHAGITIS: ICD-10-CM

## 2024-03-01 PROCEDURE — 99213 OFFICE O/P EST LOW 20 MIN: CPT

## 2024-03-01 NOTE — REVIEW OF SYSTEMS
[Hoarseness] : hoarseness [Throat Clearing] : throat clearing [Negative] : Heme/Lymph [de-identified] : constant coughing  [FreeTextEntry1] : prescribed medication had no effect

## 2024-03-01 NOTE — HISTORY OF PRESENT ILLNESS
[de-identified] : f/u re throat still w hoarseness and intermittent cough nonproductive co pnd using flonase had trial ppi daily advair

## 2024-03-01 NOTE — ASSESSMENT
[FreeTextEntry1] : still w throat clearing  pnd no help w ppi dc pantoprzole dc fluticasone trial ipratropium hoarseness related to Advair

## 2024-03-11 ENCOUNTER — RX RENEWAL (OUTPATIENT)
Age: 79
End: 2024-03-11

## 2024-03-18 ENCOUNTER — APPOINTMENT (OUTPATIENT)
Dept: PHARMACY | Facility: CLINIC | Age: 79
End: 2024-03-18
Payer: SELF-PAY

## 2024-03-18 PROCEDURE — V5299A: CUSTOM

## 2024-03-19 ENCOUNTER — TRANSCRIPTION ENCOUNTER (OUTPATIENT)
Age: 79
End: 2024-03-19

## 2024-03-20 ENCOUNTER — TRANSCRIPTION ENCOUNTER (OUTPATIENT)
Age: 79
End: 2024-03-20

## 2024-03-21 ENCOUNTER — APPOINTMENT (OUTPATIENT)
Dept: FAMILY MEDICINE | Facility: CLINIC | Age: 79
End: 2024-03-21
Payer: MEDICARE

## 2024-03-21 VITALS — BODY MASS INDEX: 29.3 KG/M2 | OXYGEN SATURATION: 96 % | TEMPERATURE: 97.9 F | HEART RATE: 69 BPM | WEIGHT: 216 LBS

## 2024-03-21 VITALS — SYSTOLIC BLOOD PRESSURE: 124 MMHG | DIASTOLIC BLOOD PRESSURE: 82 MMHG

## 2024-03-21 VITALS — HEART RATE: 72 BPM | DIASTOLIC BLOOD PRESSURE: 70 MMHG | SYSTOLIC BLOOD PRESSURE: 122 MMHG | RESPIRATION RATE: 16 BRPM

## 2024-03-21 DIAGNOSIS — E78.00 PURE HYPERCHOLESTEROLEMIA, UNSPECIFIED: ICD-10-CM

## 2024-03-21 DIAGNOSIS — F32.A DEPRESSION, UNSPECIFIED: ICD-10-CM

## 2024-03-21 DIAGNOSIS — E11.9 TYPE 2 DIABETES MELLITUS W/OUT COMPLICATIONS: ICD-10-CM

## 2024-03-21 DIAGNOSIS — F41.9 ANXIETY DISORDER, UNSPECIFIED: ICD-10-CM

## 2024-03-21 DIAGNOSIS — K86.2 CYST OF PANCREAS: ICD-10-CM

## 2024-03-21 DIAGNOSIS — I10 ESSENTIAL (PRIMARY) HYPERTENSION: ICD-10-CM

## 2024-03-21 DIAGNOSIS — G47.33 OBSTRUCTIVE SLEEP APNEA (ADULT) (PEDIATRIC): ICD-10-CM

## 2024-03-21 PROCEDURE — G0439: CPT

## 2024-03-21 PROCEDURE — G0444 DEPRESSION SCREEN ANNUAL: CPT | Mod: 59

## 2024-03-21 PROCEDURE — 99213 OFFICE O/P EST LOW 20 MIN: CPT | Mod: 25

## 2024-03-21 RX ORDER — NIRMATRELVIR AND RITONAVIR 300-100 MG
20 X 150 MG & KIT ORAL
Qty: 30 | Refills: 0 | Status: COMPLETED | COMMUNITY
Start: 2023-03-21 | End: 2024-03-21

## 2024-03-21 RX ORDER — IPRATROPIUM BROMIDE 42 UG/1
0.06 SPRAY NASAL
Qty: 3 | Refills: 3 | Status: COMPLETED | COMMUNITY
Start: 2024-03-01 | End: 2024-03-21

## 2024-03-21 RX ORDER — PANTOPRAZOLE 40 MG/1
40 TABLET, DELAYED RELEASE ORAL
Qty: 1 | Refills: 5 | Status: COMPLETED | COMMUNITY
Start: 2024-01-19 | End: 2024-03-21

## 2024-03-21 RX ORDER — ALPRAZOLAM 0.25 MG/1
0.25 TABLET ORAL
Qty: 30 | Refills: 0 | Status: ACTIVE | COMMUNITY
Start: 2022-10-12 | End: 1900-01-01

## 2024-03-21 NOTE — HEALTH RISK ASSESSMENT
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  [de-identified] : radha  [de-identified] : taking sertraline  [[id number]] : 0 [Change in mental status noted] : No change in mental status noted [None] : None [With Family] : lives with family [Employed] : employed [College] : College [] :  [# Of Children ___] : has [unfilled] children [Reports changes in hearing] : Reports no changes in hearing [Reports changes in vision] : Reports no changes in vision [Reports changes in dental health] : Reports no changes in dental health [Smoke Detector] : smoke detector [Carbon Monoxide Detector] : carbon monoxide detector [Seat Belt] :  uses seat belt [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

## 2024-03-21 NOTE — PHYSICAL EXAM
[No Acute Distress] : no acute distress [PERRL] : pupils equal round and reactive to light [Normal TMs] : both tympanic membranes were normal [Supple] : supple [Clear to Auscultation] : lungs were clear to auscultation bilaterally [Regular Rhythm] : with a regular rhythm [No Murmur] : no murmur heard [No Edema] : there was no peripheral edema [Soft] : abdomen soft [No HSM] : no HSM [Normal] : soft, non-tender, non-distended, no masses palpated, no HSM and normal bowel sounds [Normal Supraclavicular Nodes] : no supraclavicular lymphadenopathy [Normal Posterior Cervical Nodes] : no posterior cervical lymphadenopathy [Normal Anterior Cervical Nodes] : no anterior cervical lymphadenopathy [No CVA Tenderness] : no CVA  tenderness [No Joint Swelling] : no joint swelling [No Rash] : no rash [No Focal Deficits] : no focal deficits [Normal Insight/Judgement] : insight and judgment were intact

## 2024-03-21 NOTE — HISTORY OF PRESENT ILLNESS
[Spouse] : spouse [de-identified] : feeling well checks glucose once a day in good range  [FreeTextEntry1] : pt here for cpe and management of bph dm depression and hld

## 2024-03-21 NOTE — ASSESSMENT
[FreeTextEntry1] : pancreatic cyst being followed by [facility]  HLD CONTINUE SIMVASTATIN BP ACCEPTABLE CONTINUE VALSARTAN DM CONTINUE METFORMIN SEVEN USING C-PAP lab evaluation

## 2024-03-22 LAB
ALBUMIN SERPL ELPH-MCNC: 4.4 G/DL
ALP BLD-CCNC: 73 U/L
ALT SERPL-CCNC: 13 U/L
ANION GAP SERPL CALC-SCNC: 11 MMOL/L
APPEARANCE: CLEAR
AST SERPL-CCNC: 13 U/L
BACTERIA: NEGATIVE /HPF
BASOPHILS # BLD AUTO: 0.02 K/UL
BASOPHILS NFR BLD AUTO: 0.2 %
BILIRUB SERPL-MCNC: 0.5 MG/DL
BILIRUBIN URINE: NEGATIVE
BLOOD URINE: NEGATIVE
BUN SERPL-MCNC: 26 MG/DL
CALCIUM SERPL-MCNC: 9.3 MG/DL
CAST: 0 /LPF
CHLORIDE SERPL-SCNC: 103 MMOL/L
CHOLEST SERPL-MCNC: 163 MG/DL
CO2 SERPL-SCNC: 28 MMOL/L
COLOR: YELLOW
CREAT SERPL-MCNC: 1.51 MG/DL
CREAT SPEC-SCNC: 121 MG/DL
EGFR: 47 ML/MIN/1.73M2
EOSINOPHIL # BLD AUTO: 0.21 K/UL
EOSINOPHIL NFR BLD AUTO: 2.4 %
EPITHELIAL CELLS: 2 /HPF
ESTIMATED AVERAGE GLUCOSE: 154 MG/DL
GLUCOSE QUALITATIVE U: 100 MG/DL
GLUCOSE SERPL-MCNC: 119 MG/DL
HBA1C MFR BLD HPLC: 7 %
HCT VFR BLD CALC: 43 %
HDLC SERPL-MCNC: 40 MG/DL
HGB BLD-MCNC: 14 G/DL
IMM GRANULOCYTES NFR BLD AUTO: 0.3 %
KETONES URINE: NEGATIVE MG/DL
LDLC SERPL CALC-MCNC: 79 MG/DL
LEUKOCYTE ESTERASE URINE: ABNORMAL
LYMPHOCYTES # BLD AUTO: 1.84 K/UL
LYMPHOCYTES NFR BLD AUTO: 20.9 %
MAN DIFF?: NORMAL
MCHC RBC-ENTMCNC: 30 PG
MCHC RBC-ENTMCNC: 32.6 GM/DL
MCV RBC AUTO: 92.3 FL
MICROALBUMIN 24H UR DL<=1MG/L-MCNC: 1.4 MG/DL
MICROALBUMIN/CREAT 24H UR-RTO: 12 MG/G
MICROSCOPIC-UA: NORMAL
MONOCYTES # BLD AUTO: 0.56 K/UL
MONOCYTES NFR BLD AUTO: 6.4 %
NEUTROPHILS # BLD AUTO: 6.13 K/UL
NEUTROPHILS NFR BLD AUTO: 69.8 %
NITRITE URINE: NEGATIVE
NONHDLC SERPL-MCNC: 123 MG/DL
PH URINE: 5.5
PLATELET # BLD AUTO: 249 K/UL
POTASSIUM SERPL-SCNC: 4.1 MMOL/L
PROT SERPL-MCNC: 7.1 G/DL
PROTEIN URINE: NEGATIVE MG/DL
RBC # BLD: 4.66 M/UL
RBC # FLD: 14.4 %
RED BLOOD CELLS URINE: 0 /HPF
SODIUM SERPL-SCNC: 142 MMOL/L
SPECIFIC GRAVITY URINE: 1.02
T4 SERPL-MCNC: 6.4 UG/DL
TRIGL SERPL-MCNC: 269 MG/DL
TSH SERPL-ACNC: 1.32 UIU/ML
URATE SERPL-MCNC: 7 MG/DL
UROBILINOGEN URINE: 0.2 MG/DL
WBC # FLD AUTO: 8.79 K/UL
WHITE BLOOD CELLS URINE: 0 /HPF

## 2024-03-22 RX ORDER — DAPAGLIFLOZIN 10 MG/1
10 TABLET, FILM COATED ORAL
Qty: 90 | Refills: 1 | Status: ACTIVE | COMMUNITY
Start: 2024-03-22 | End: 1900-01-01

## 2024-03-25 LAB — LAMOTRIGINE SERPL-MCNC: 5.2 UG/ML

## 2024-04-01 ENCOUNTER — RX RENEWAL (OUTPATIENT)
Age: 79
End: 2024-04-01

## 2024-04-02 ENCOUNTER — RX RENEWAL (OUTPATIENT)
Age: 79
End: 2024-04-02

## 2024-04-02 ENCOUNTER — TRANSCRIPTION ENCOUNTER (OUTPATIENT)
Age: 79
End: 2024-04-02

## 2024-04-02 RX ORDER — VALSARTAN AND HYDROCHLOROTHIAZIDE 80; 12.5 MG/1; MG/1
80-12.5 TABLET, FILM COATED ORAL
Qty: 90 | Refills: 1 | Status: ACTIVE | COMMUNITY
Start: 2021-02-09 | End: 1900-01-01

## 2024-04-10 RX ORDER — FLUTICASONE PROPIONATE AND SALMETEROL 250; 50 UG/1; UG/1
250-50 POWDER RESPIRATORY (INHALATION)
Qty: 1 | Refills: 3 | Status: ACTIVE | COMMUNITY
Start: 2021-07-19 | End: 1900-01-01

## 2024-04-24 ENCOUNTER — NON-APPOINTMENT (OUTPATIENT)
Age: 79
End: 2024-04-24

## 2024-05-14 ENCOUNTER — TRANSCRIPTION ENCOUNTER (OUTPATIENT)
Age: 79
End: 2024-05-14

## 2024-05-15 ENCOUNTER — TRANSCRIPTION ENCOUNTER (OUTPATIENT)
Age: 79
End: 2024-05-15

## 2024-09-06 ENCOUNTER — NON-APPOINTMENT (OUTPATIENT)
Age: 79
End: 2024-09-06

## 2024-09-11 ENCOUNTER — TRANSCRIPTION ENCOUNTER (OUTPATIENT)
Age: 79
End: 2024-09-11

## 2024-09-12 ENCOUNTER — TRANSCRIPTION ENCOUNTER (OUTPATIENT)
Age: 79
End: 2024-09-12

## 2024-09-13 ENCOUNTER — TRANSCRIPTION ENCOUNTER (OUTPATIENT)
Age: 79
End: 2024-09-13

## 2024-12-09 ENCOUNTER — RX RENEWAL (OUTPATIENT)
Age: 79
End: 2024-12-09

## 2025-01-07 ENCOUNTER — RX RENEWAL (OUTPATIENT)
Age: 80
End: 2025-01-07

## 2025-01-14 ENCOUNTER — RX RENEWAL (OUTPATIENT)
Age: 80
End: 2025-01-14

## 2025-01-27 ENCOUNTER — APPOINTMENT (OUTPATIENT)
Dept: OTOLARYNGOLOGY | Facility: CLINIC | Age: 80
End: 2025-01-27
Payer: MEDICARE

## 2025-01-27 ENCOUNTER — NON-APPOINTMENT (OUTPATIENT)
Age: 80
End: 2025-01-27

## 2025-01-27 ENCOUNTER — APPOINTMENT (OUTPATIENT)
Dept: PHARMACY | Facility: CLINIC | Age: 80
End: 2025-01-27
Payer: SELF-PAY

## 2025-01-27 VITALS — HEIGHT: 72 IN | WEIGHT: 211 LBS | BODY MASS INDEX: 28.58 KG/M2

## 2025-01-27 DIAGNOSIS — J32.2 CHRONIC ETHMOIDAL SINUSITIS: ICD-10-CM

## 2025-01-27 DIAGNOSIS — H60.543 ACUTE ECZEMATOID OTITIS EXTERNA, BILATERAL: ICD-10-CM

## 2025-01-27 DIAGNOSIS — H61.23 IMPACTED CERUMEN, BILATERAL: ICD-10-CM

## 2025-01-27 PROCEDURE — V5299A: CUSTOM

## 2025-01-27 PROCEDURE — 92567 TYMPANOMETRY: CPT

## 2025-01-27 PROCEDURE — 31231 NASAL ENDOSCOPY DX: CPT

## 2025-01-27 PROCEDURE — 99213 OFFICE O/P EST LOW 20 MIN: CPT | Mod: 25

## 2025-01-27 PROCEDURE — G0268 REMOVAL OF IMPACTED WAX MD: CPT

## 2025-01-27 PROCEDURE — 92557 COMPREHENSIVE HEARING TEST: CPT

## 2025-01-27 RX ORDER — AMOXICILLIN AND CLAVULANATE POTASSIUM 875; 125 MG/1; MG/1
875-125 TABLET, COATED ORAL TWICE DAILY
Qty: 20 | Refills: 3 | Status: ACTIVE | COMMUNITY
Start: 2025-01-27 | End: 2025-03-08

## 2025-01-27 RX ORDER — MOMETASONE FUROATE 1 MG/G
0.1 OINTMENT TOPICAL
Qty: 1 | Refills: 2 | Status: ACTIVE | COMMUNITY
Start: 2025-01-27 | End: 1900-01-01

## 2025-02-04 ENCOUNTER — APPOINTMENT (OUTPATIENT)
Dept: NEUROLOGY | Facility: CLINIC | Age: 80
End: 2025-02-04
Payer: MEDICARE

## 2025-02-04 VITALS
HEIGHT: 72 IN | HEART RATE: 66 BPM | WEIGHT: 211 LBS | TEMPERATURE: 98.2 F | DIASTOLIC BLOOD PRESSURE: 77 MMHG | SYSTOLIC BLOOD PRESSURE: 127 MMHG | BODY MASS INDEX: 28.58 KG/M2

## 2025-02-04 DIAGNOSIS — G40.909 EPILEPSY, UNSPECIFIED, NOT INTRACTABLE, W/OUT STATUS EPILEPTICUS: ICD-10-CM

## 2025-02-04 PROCEDURE — 99204 OFFICE O/P NEW MOD 45 MIN: CPT

## 2025-02-10 ENCOUNTER — RX RENEWAL (OUTPATIENT)
Age: 80
End: 2025-02-10

## 2025-03-10 ENCOUNTER — RX RENEWAL (OUTPATIENT)
Age: 80
End: 2025-03-10

## 2025-03-26 ENCOUNTER — APPOINTMENT (OUTPATIENT)
Dept: FAMILY MEDICINE | Facility: CLINIC | Age: 80
End: 2025-03-26
Payer: MEDICARE

## 2025-03-26 VITALS — RESPIRATION RATE: 16 BRPM | DIASTOLIC BLOOD PRESSURE: 70 MMHG | HEART RATE: 66 BPM | SYSTOLIC BLOOD PRESSURE: 124 MMHG

## 2025-03-26 VITALS
HEIGHT: 72 IN | RESPIRATION RATE: 16 BRPM | TEMPERATURE: 97.2 F | DIASTOLIC BLOOD PRESSURE: 82 MMHG | WEIGHT: 216 LBS | BODY MASS INDEX: 29.26 KG/M2 | OXYGEN SATURATION: 95 % | SYSTOLIC BLOOD PRESSURE: 140 MMHG | HEART RATE: 65 BPM

## 2025-03-26 DIAGNOSIS — G40.909 EPILEPSY, UNSPECIFIED, NOT INTRACTABLE, W/OUT STATUS EPILEPTICUS: ICD-10-CM

## 2025-03-26 DIAGNOSIS — G47.33 OBSTRUCTIVE SLEEP APNEA (ADULT) (PEDIATRIC): ICD-10-CM

## 2025-03-26 DIAGNOSIS — I10 ESSENTIAL (PRIMARY) HYPERTENSION: ICD-10-CM

## 2025-03-26 DIAGNOSIS — E78.00 PURE HYPERCHOLESTEROLEMIA, UNSPECIFIED: ICD-10-CM

## 2025-03-26 DIAGNOSIS — E11.9 TYPE 2 DIABETES MELLITUS W/OUT COMPLICATIONS: ICD-10-CM

## 2025-03-26 DIAGNOSIS — F32.A DEPRESSION, UNSPECIFIED: ICD-10-CM

## 2025-03-26 PROCEDURE — 99497 ADVNCD CARE PLAN 30 MIN: CPT

## 2025-03-26 PROCEDURE — G0439: CPT

## 2025-03-27 LAB
ALBUMIN SERPL ELPH-MCNC: 4.5 G/DL
ALP BLD-CCNC: 78 U/L
ALT SERPL-CCNC: 13 U/L
ANION GAP SERPL CALC-SCNC: 13 MMOL/L
APPEARANCE: CLEAR
AST SERPL-CCNC: 15 U/L
BACTERIA: NEGATIVE /HPF
BASOPHILS # BLD AUTO: 0.03 K/UL
BASOPHILS NFR BLD AUTO: 0.4 %
BILIRUB SERPL-MCNC: 0.6 MG/DL
BILIRUBIN URINE: NEGATIVE
BLOOD URINE: NEGATIVE
BUN SERPL-MCNC: 29 MG/DL
CALCIUM SERPL-MCNC: 9.5 MG/DL
CAST: 1 /LPF
CHLORIDE SERPL-SCNC: 107 MMOL/L
CHOLEST SERPL-MCNC: 159 MG/DL
CO2 SERPL-SCNC: 27 MMOL/L
COLOR: YELLOW
CREAT SERPL-MCNC: 1.81 MG/DL
CREAT SPEC-SCNC: 131 MG/DL
EGFRCR SERPLBLD CKD-EPI 2021: 38 ML/MIN/1.73M2
EOSINOPHIL # BLD AUTO: 0.11 K/UL
EOSINOPHIL NFR BLD AUTO: 1.4 %
EPITHELIAL CELLS: 1 /HPF
ESTIMATED AVERAGE GLUCOSE: 163 MG/DL
GLUCOSE QUALITATIVE U: NEGATIVE MG/DL
GLUCOSE SERPL-MCNC: 96 MG/DL
HBA1C MFR BLD HPLC: 7.3 %
HCT VFR BLD CALC: 41.5 %
HDLC SERPL-MCNC: 39 MG/DL
HGB BLD-MCNC: 13.3 G/DL
IMM GRANULOCYTES NFR BLD AUTO: 0.5 %
KETONES URINE: NEGATIVE MG/DL
LDLC SERPL-MCNC: 80 MG/DL
LEUKOCYTE ESTERASE URINE: NEGATIVE
LYMPHOCYTES # BLD AUTO: 1.84 K/UL
LYMPHOCYTES NFR BLD AUTO: 23.5 %
MAN DIFF?: NORMAL
MCHC RBC-ENTMCNC: 29.4 PG
MCHC RBC-ENTMCNC: 32 G/DL
MCV RBC AUTO: 91.8 FL
MICROALBUMIN 24H UR DL<=1MG/L-MCNC: 1.2 MG/DL
MICROALBUMIN/CREAT 24H UR-RTO: NORMAL MG/G
MICROSCOPIC-UA: NORMAL
MONOCYTES # BLD AUTO: 0.55 K/UL
MONOCYTES NFR BLD AUTO: 7 %
NEUTROPHILS # BLD AUTO: 5.25 K/UL
NEUTROPHILS NFR BLD AUTO: 67.2 %
NITRITE URINE: NEGATIVE
NONHDLC SERPL-MCNC: 120 MG/DL
PH URINE: 5.5
PLATELET # BLD AUTO: 252 K/UL
POTASSIUM SERPL-SCNC: 4.8 MMOL/L
PROT SERPL-MCNC: 7 G/DL
PROTEIN URINE: NEGATIVE MG/DL
RBC # BLD: 4.52 M/UL
RBC # FLD: 13.7 %
RED BLOOD CELLS URINE: 0 /HPF
SODIUM SERPL-SCNC: 147 MMOL/L
SPECIFIC GRAVITY URINE: 1.02
T4 SERPL-MCNC: 6.6 UG/DL
TRIGL SERPL-MCNC: 244 MG/DL
TSH SERPL-ACNC: 1.05 UIU/ML
URATE SERPL-MCNC: 7.4 MG/DL
UROBILINOGEN URINE: 0.2 MG/DL
WBC # FLD AUTO: 7.82 K/UL
WHITE BLOOD CELLS URINE: 0 /HPF

## 2025-04-01 LAB — LAMOTRIGINE SERPL-MCNC: 5 UG/ML

## 2025-04-28 ENCOUNTER — TRANSCRIPTION ENCOUNTER (OUTPATIENT)
Age: 80
End: 2025-04-28

## 2025-05-27 ENCOUNTER — TRANSCRIPTION ENCOUNTER (OUTPATIENT)
Age: 80
End: 2025-05-27

## 2025-05-28 ENCOUNTER — APPOINTMENT (OUTPATIENT)
Dept: PHARMACY | Facility: CLINIC | Age: 80
End: 2025-05-28
Payer: SELF-PAY

## 2025-05-28 PROCEDURE — V5299A: CUSTOM

## 2025-06-25 ENCOUNTER — APPOINTMENT (OUTPATIENT)
Dept: PHARMACY | Facility: CLINIC | Age: 80
End: 2025-06-25
Payer: SELF-PAY

## 2025-06-25 PROCEDURE — V5299A: CUSTOM

## 2025-08-25 ENCOUNTER — RX RENEWAL (OUTPATIENT)
Age: 80
End: 2025-08-25